# Patient Record
Sex: FEMALE | Race: BLACK OR AFRICAN AMERICAN | Employment: FULL TIME | URBAN - METROPOLITAN AREA
[De-identification: names, ages, dates, MRNs, and addresses within clinical notes are randomized per-mention and may not be internally consistent; named-entity substitution may affect disease eponyms.]

---
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
## 2020-02-05 ENCOUNTER — ED HISTORICAL/CONVERTED ENCOUNTER (OUTPATIENT)
Dept: OTHER | Age: 20
End: 2020-02-05

## 2020-07-15 VITALS
WEIGHT: 229.4 LBS | DIASTOLIC BLOOD PRESSURE: 74 MMHG | SYSTOLIC BLOOD PRESSURE: 118 MMHG | HEIGHT: 67 IN | BODY MASS INDEX: 36 KG/M2

## 2020-07-15 DIAGNOSIS — R10.2 VAGINAL PAIN: ICD-10-CM

## 2020-07-15 PROBLEM — A60.00 GENITAL HERPES SIMPLEX: Status: ACTIVE | Noted: 2020-07-15

## 2020-07-15 PROBLEM — E66.01 MORBID OBESITY (HCC): Status: ACTIVE | Noted: 2020-07-15

## 2020-07-15 PROBLEM — A74.9 CHLAMYDIAL INFECTION: Status: ACTIVE | Noted: 2020-07-15

## 2020-07-15 RX ORDER — HYDROXYZINE 25 MG/1
TABLET, FILM COATED ORAL
COMMUNITY
End: 2021-08-05 | Stop reason: ALTCHOICE

## 2020-07-15 RX ORDER — TRIAMCINOLONE ACETONIDE 1 MG/G
CREAM TOPICAL 2 TIMES DAILY
COMMUNITY
End: 2021-08-05 | Stop reason: ALTCHOICE

## 2020-07-15 RX ORDER — CLINDAMYCIN HYDROCHLORIDE 300 MG/1
300 CAPSULE ORAL 3 TIMES DAILY
COMMUNITY
End: 2021-08-05 | Stop reason: ALTCHOICE

## 2020-07-15 RX ORDER — LORATADINE 10 MG/1
10 TABLET ORAL
COMMUNITY
End: 2021-08-05 | Stop reason: ALTCHOICE

## 2020-07-15 RX ORDER — NORETHINDRONE ACETATE AND ETHINYL ESTRADIOL AND FERROUS FUMARATE 1MG-20(24)
KIT ORAL
COMMUNITY
End: 2020-08-18 | Stop reason: SDUPTHER

## 2020-07-15 RX ORDER — ACYCLOVIR 800 MG/1
800 TABLET ORAL 2 TIMES DAILY
COMMUNITY
End: 2020-11-14

## 2020-08-18 ENCOUNTER — OFFICE VISIT (OUTPATIENT)
Dept: OBGYN CLINIC | Age: 20
End: 2020-08-18
Payer: MEDICAID

## 2020-08-18 DIAGNOSIS — Z12.4 SCREENING FOR MALIGNANT NEOPLASM OF CERVIX: ICD-10-CM

## 2020-08-18 DIAGNOSIS — Z11.3 SCREENING EXAMINATION FOR VENEREAL DISEASE: Primary | ICD-10-CM

## 2020-08-18 DIAGNOSIS — Z01.419 GYNECOLOGIC EXAM NORMAL: ICD-10-CM

## 2020-08-18 DIAGNOSIS — N89.8 VAGINAL ODOR: ICD-10-CM

## 2020-08-18 DIAGNOSIS — N94.89 SUPPRESSION OF MENSTRUATION: ICD-10-CM

## 2020-08-18 PROCEDURE — 99395 PREV VISIT EST AGE 18-39: CPT | Performed by: OBSTETRICS & GYNECOLOGY

## 2020-08-18 RX ORDER — NORETHINDRONE ACETATE AND ETHINYL ESTRADIOL AND FERROUS FUMARATE 1MG-20(24)
1 KIT ORAL DAILY
Qty: 3 PACKAGE | Refills: 3 | Status: SHIPPED | OUTPATIENT
Start: 2020-08-18 | End: 2021-08-05 | Stop reason: ALTCHOICE

## 2020-08-18 RX ORDER — NORETHINDRONE ACETATE AND ETHINYL ESTRADIOL AND FERROUS FUMARATE 1MG-20(24)
1 KIT ORAL DAILY
Qty: 3 PACKAGE | Refills: 3 | Status: SHIPPED | OUTPATIENT
Start: 2020-08-18 | End: 2020-08-18 | Stop reason: SDUPTHER

## 2020-08-18 NOTE — PROGRESS NOTES
Angelica White is a [de-identified] , 21 y.o. female   No LMP recorded. She presents for her annual    She is having continues with vaginal odor off and on. Menstrual status:  Her periods are: normal. Cycles are: usually regular with a 26-32 day interval with 3-7 day duration. She does not have dysmenorrhea. Medical conditions:  Since her last annual GYN exam about one year ago, she has not the following changes in her health history: none. Past Medical History:   Diagnosis Date    Chlamydial infection 7/15/2020    Genital herpes simplex 7/15/2020    Herpes     Morbid obesity ([address] 7/15/2020    Vaginal pain 7/15/2020    *Chronic     History reviewed. No pertinent surgical history. Prior to Admission medications    Medication Sig Start Date End Date Taking? Authorizing Provider   norethindrone-e estradiol-iron (Junel Fe 24) 1 mg-20 mcg (24)/75 mg (4) tab Take 1 Tab by mouth daily. 8/18/20  Yes Andreina Molina MD   acyclovir (ZOVIRAX) 800 mg tablet Take 800 mg by mouth two (2) times a day. Yes Provider, Historical   loratadine (CLARITIN) 10 mg tablet Take 10 mg by mouth. Yes Provider, Historical   clindamycin (CLEOCIN) 300 mg capsule Take 300 mg by mouth three (3) times daily. Provider, Historical   hydrOXYzine HCL (ATARAX) 25 mg tablet Take  by mouth three (3) times daily as needed. Provider, Historical   triamcinolone acetonide (KENALOG) 0.1 % topical cream Apply  to affected area two (2) times a day. use thin layer    Provider, Historical       No Known Allergies       Tobacco History:  reports that she has never smoked. She does not have any smokeless tobacco history on file. Alcohol Abuse:  has no history on file for alcohol. Drug Abuse:  has no history on file for drug. Family Medical/Cancer History: History reviewed. No pertinent family history. Review of Systems   Constitutional: Negative for chills, fever, malaise/fatigue and weight loss.    HENT: Negative for congestion, ear pain, sinus pain and tinnitus. Eyes: Negative for blurred vision and double vision. Respiratory: Negative for cough, shortness of breath and wheezing. Cardiovascular: Negative for chest pain and palpitations. Gastrointestinal: Negative for abdominal pain, blood in stool, constipation, diarrhea, heartburn, nausea and vomiting. Genitourinary: Negative for dysuria, flank pain, frequency, hematuria and urgency. Musculoskeletal: Negative for joint pain and myalgias. Skin: Negative for itching and rash. Neurological: Negative for dizziness, weakness and headaches. Psychiatric/Behavioral: Negative for depression, memory loss and suicidal ideas. The patient is not nervous/anxious and does not have insomnia. Physical Exam  Constitutional:       Appearance: Normal appearance. HENT:      Head: Normocephalic and atraumatic. Cardiovascular:      Rate and Rhythm: Normal rate. Heart sounds: Normal heart sounds. Pulmonary:      Effort: Pulmonary effort is normal.      Breath sounds: Normal breath sounds. Chest:      Breasts:         Right: Normal.         Left: Normal.   Abdominal:      General: Abdomen is flat. Bowel sounds are normal.      Palpations: Abdomen is soft. Genitourinary:     General: Normal vulva. Vagina: Normal.      Cervix: Normal.      Uterus: Normal.       Adnexa: Right adnexa normal and left adnexa normal.      Rectum: Normal.      Comments: PAP Obtained  Neurological:      Mental Status: She is alert. Psychiatric:         Mood and Affect: Mood normal.         Behavior: Behavior normal.         Thought Content: Thought content normal.          There were no vitals taken for this visit. Assessment:  Diagnoses and all orders for this visit:    1.  Screening examination for venereal disease  -     PAP IG, CT-NG, RFX APTIMA HPV ASCUS ([id number], [id number]))  -     HEP B SURFACE AG  -     HCV AB W/REFLEX VERIFICATION  -     RPR  -     HIV 1/2 AG/AB, 4TH GENERATION,W RFLX CONFIRM  -     HSV1/HSV2(IGG/M)    2. Screening for malignant neoplasm of cervix  -     PAP IG, CT-NG, RFX APTIMA HPV ASCUS ([id number], [id number]))    3. Gynecologic exam normal    4. Suppression of menstruation    5. Vaginal odor    Other orders  -     norethindrone-e estradiol-iron (Junel Fe 24) 1 mg-20 mcg (24)/75 mg (4) tab; Take 1 Tab by mouth daily.         Plan:Boric Acid supp recommended  STD labs  Counseled re: diet, exercise, healthy lifestyle  Return for Annual        Follow-up and Dispositions    · Return for 1 yr annual.

## 2020-08-20 LAB
COMMENT, 144067: NORMAL
HBV SURFACE AG SERPL QL IA: NEGATIVE
HCV AB S/CO SERPL IA: <0.1 S/CO RATIO (ref 0–0.9)
HIV 1+2 AB+HIV1 P24 AG SERPL QL IA: NON REACTIVE
RPR SER QL: NON REACTIVE

## 2020-08-22 LAB
C TRACH RRNA CVX QL NAA+PROBE: NEGATIVE
CYTOLOGIST CVX/VAG CYTO: ABNORMAL
CYTOLOGY CVX/VAG DOC CYTO: ABNORMAL
CYTOLOGY CVX/VAG DOC THIN PREP: ABNORMAL
DX ICD CODE: ABNORMAL
DX ICD CODE: ABNORMAL
LABCORP, 190119: ABNORMAL
Lab: ABNORMAL
N GONORRHOEA RRNA CVX QL NAA+PROBE: NEGATIVE
OTHER STN SPEC: ABNORMAL
PATHOLOGIST CVX/VAG CYTO: ABNORMAL
STAT OF ADQ CVX/VAG CYTO-IMP: ABNORMAL

## 2020-09-01 NOTE — PROGRESS NOTES
9/1/2020:    Pap: LGSIL  GC/CT: Negative    LVM explaining results and to call and get set up for a [facility] appt. --instructions given to call [phone]

## 2020-11-04 ENCOUNTER — HOSPITAL ENCOUNTER (EMERGENCY)
Age: 20
Discharge: HOME | End: 2020-11-04
Payer: MEDICAID

## 2020-11-04 ENCOUNTER — APPOINTMENT (OUTPATIENT)
Dept: CT IMAGING | Age: 20
End: 2020-11-04
Attending: NURSE PRACTITIONER
Payer: MEDICAID

## 2020-11-04 VITALS
SYSTOLIC BLOOD PRESSURE: 123 MMHG | DIASTOLIC BLOOD PRESSURE: 72 MMHG | WEIGHT: 240 LBS | HEIGHT: 67 IN | BODY MASS INDEX: 37.67 KG/M2 | OXYGEN SATURATION: 100 % | HEART RATE: 93 BPM | TEMPERATURE: 98.8 F | RESPIRATION RATE: 15 BRPM
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
## 2020-11-04 DIAGNOSIS — S16.1XXA STRAIN OF NECK MUSCLE, INITIAL ENCOUNTER: ICD-10-CM

## 2020-11-04 LAB
ANION GAP SERPL CALC-SCNC: 7 MMOL/L (ref 5–15)
APPEARANCE UR: CLEAR
BACTERIA URNS QL MICRO: NEGATIVE /HPF
BASOPHILS # BLD: 0 K/UL (ref 0–0.1)
BASOPHILS NFR BLD: 1 % (ref 0–1)
BILIRUB UR QL: NEGATIVE
BUN SERPL-MCNC: 12 MG/DL (ref 6–20)
BUN/CREAT SERPL: 16 (ref 12–20)
CA-I BLD-MCNC: 8.9 MG/DL (ref 8.5–10.1)
CHLORIDE SERPL-SCNC: 105 MMOL/L (ref 97–108)
CO2 SERPL-SCNC: 27 MMOL/L (ref 21–32)
COLOR UR: ABNORMAL
CREAT SERPL-MCNC: 0.74 MG/DL (ref 0.55–1.02)
DIFFERENTIAL METHOD BLD: ABNORMAL
EOSINOPHIL # BLD: 0.1 K/UL (ref 0–0.4)
EOSINOPHIL NFR BLD: 2 % (ref 0–7)
ERYTHROCYTE [DISTWIDTH] IN BLOOD BY AUTOMATED COUNT: 12.9 % (ref 11.5–14.5)
GLUCOSE SERPL-MCNC: 79 MG/DL (ref 65–100)
GLUCOSE UR STRIP.AUTO-MCNC: NEGATIVE MG/DL
HCT VFR BLD AUTO: 39 % (ref 35–47)
HGB BLD-MCNC: 12.7 G/DL (ref 11.5–16)
HGB UR QL STRIP: NEGATIVE
IMM GRANULOCYTES # BLD AUTO: 0 K/UL (ref 0–0.04)
IMM GRANULOCYTES NFR BLD AUTO: 0 % (ref 0–0.5)
KETONES UR QL STRIP.AUTO: NEGATIVE MG/DL
LEUKOCYTE ESTERASE UR QL STRIP.AUTO: ABNORMAL
LYMPHOCYTES # BLD: 2 K/UL (ref 0.8–3.5)
LYMPHOCYTES NFR BLD: 49 % (ref 12–49)
MCH RBC QN AUTO: 29.6 PG (ref 26–34)
MCHC RBC AUTO-ENTMCNC: 32.6 G/DL (ref 30–36.5)
MCV RBC AUTO: 90.9 FL (ref 80–99)
MONOCYTES # BLD: 0.5 K/UL (ref 0–1)
MONOCYTES NFR BLD: 12 % (ref 5–13)
MUCOUS THREADS URNS QL MICRO: ABNORMAL /LPF
NEUTS SEG # BLD: 1.5 K/UL (ref 1.8–8)
NEUTS SEG NFR BLD: 36 % (ref 32–75)
NITRITE UR QL STRIP.AUTO: NEGATIVE
PH UR STRIP: 6 [PH] (ref 5–8)
PLATELET # BLD AUTO: 304 K/UL (ref 150–400)
PMV BLD AUTO: 10.7 FL (ref 8.9–12.9)
POTASSIUM SERPL-SCNC: 4 MMOL/L (ref 3.5–5.1)
PROT UR STRIP-MCNC: NEGATIVE MG/DL
RBC # BLD AUTO: 4.29 M/UL (ref 3.8–5.2)
RBC #/AREA URNS HPF: ABNORMAL /HPF (ref 0–5)
SODIUM SERPL-SCNC: 139 MMOL/L (ref 136–145)
SP GR UR REFRACTOMETRY: 1.02 (ref 1–1.03)
UA: UC IF INDICATED,UAUC: ABNORMAL
UROBILINOGEN UR QL STRIP.AUTO: 0.1 EU/DL (ref 0.1–1)
WBC # BLD AUTO: 4 K/UL (ref 3.6–11)
WBC URNS QL MICRO: ABNORMAL /HPF (ref 0–4)

## 2020-11-04 PROCEDURE — 74011250637 HC RX REV CODE- 250/637: Performed by: NURSE PRACTITIONER

## 2020-11-04 PROCEDURE — 85025 COMPLETE CBC W/AUTO DIFF WBC: CPT

## 2020-11-04 PROCEDURE — 74177 CT ABD & PELVIS W/CONTRAST: CPT

## 2020-11-04 PROCEDURE — 36415 COLL VENOUS BLD VENIPUNCTURE: CPT

## 2020-11-04 PROCEDURE — 74011000636 HC RX REV CODE- 636: Performed by: NURSE PRACTITIONER

## 2020-11-04 PROCEDURE — 99283 EMERGENCY DEPT VISIT LOW MDM: CPT

## 2020-11-04 PROCEDURE — 81001 URINALYSIS AUTO W/SCOPE: CPT

## 2020-11-04 PROCEDURE — 70450 CT HEAD/BRAIN W/O DYE: CPT

## 2020-11-04 PROCEDURE — 80048 BASIC METABOLIC PNL TOTAL CA: CPT

## 2020-11-04 PROCEDURE — 71275 CT ANGIOGRAPHY CHEST: CPT

## 2020-11-04 PROCEDURE — 72125 CT NECK SPINE W/O DYE: CPT

## 2020-11-04 RX ORDER — HYDROCODONE BITARTRATE AND ACETAMINOPHEN 5; 325 MG/1; MG/1
1 TABLET ORAL
Qty: 12 TAB | Refills: 0 | Status: SHIPPED | OUTPATIENT
Start: 2020-11-04 | End: 2020-11-07

## 2020-11-04 RX ORDER — CYCLOBENZAPRINE HCL 10 MG
10 TABLET ORAL
Status: COMPLETED | OUTPATIENT
Start: 2020-11-04 | End: 2020-11-04

## 2020-11-04 RX ORDER — HYDROCODONE BITARTRATE AND ACETAMINOPHEN 5; 325 MG/1; MG/1
2 TABLET ORAL
Status: COMPLETED | OUTPATIENT
Start: 2020-11-04 | End: 2020-11-04

## 2020-11-04 RX ORDER — IBUPROFEN 400 MG/1
400 TABLET ORAL
Qty: 20 TAB | Refills: 0 | Status: SHIPPED | OUTPATIENT
Start: 2020-11-04 | End: 2021-08-05 | Stop reason: ALTCHOICE

## 2020-11-04 RX ORDER — CYCLOBENZAPRINE HCL 10 MG
10 TABLET ORAL
Qty: 12 TAB | Refills: 0 | Status: SHIPPED | OUTPATIENT
Start: 2020-11-04 | End: 2021-08-05 | Stop reason: ALTCHOICE

## 2020-11-04 RX ADMIN — HYDROCODONE BITARTRATE AND ACETAMINOPHEN 2 TABLET: 5; 325 TABLET ORAL at 12:43

## 2020-11-04 RX ADMIN — CYCLOBENZAPRINE 10 MG: 10 TABLET, FILM COATED ORAL at 12:42

## 2020-11-04 RX ADMIN — IOPAMIDOL 100 ML: 755 INJECTION, SOLUTION INTRAVENOUS at 15:04
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
## 2020-11-14 RX ORDER — ACYCLOVIR 800 MG/1
TABLET ORAL
Qty: 14 TAB | Refills: 0 | Status: SHIPPED | OUTPATIENT
Start: 2020-11-14 | End: 2020-12-07

## 2020-12-07 RX ORDER — ACYCLOVIR 800 MG/1
TABLET ORAL
Qty: 14 TAB | Refills: 0 | Status: SHIPPED | OUTPATIENT
Start: 2020-12-07 | End: 2021-04-12

## 2021-01-20 ENCOUNTER — OFFICE VISIT (OUTPATIENT)
Dept: OBGYN CLINIC | Age: 21
End: 2021-01-20
Payer: COMMERCIAL

## 2021-01-20 VITALS
SYSTOLIC BLOOD PRESSURE: 120 MMHG | WEIGHT: 247 LBS | DIASTOLIC BLOOD PRESSURE: 84 MMHG | BODY MASS INDEX: 38.77 KG/M2 | HEIGHT: 67 IN

## 2021-01-20 DIAGNOSIS — N76.0 VAGINITIS AND VULVOVAGINITIS: ICD-10-CM

## 2021-01-20 DIAGNOSIS — Z11.3 ROUTINE SCREENING FOR STI (SEXUALLY TRANSMITTED INFECTION): Primary | ICD-10-CM

## 2021-01-20 PROCEDURE — 99214 OFFICE O/P EST MOD 30 MIN: CPT | Performed by: OBSTETRICS & GYNECOLOGY

## 2021-01-20 RX ORDER — FLUCONAZOLE 150 MG/1
150 TABLET ORAL DAILY
Qty: 1 TAB | Refills: 0 | Status: SHIPPED | OUTPATIENT
Start: 2021-01-20 | End: 2021-01-21

## 2021-01-20 NOTE — PROGRESS NOTES
Arslan Mccormick is a 21 y.o. female, , No LMP recorded (lmp unknown). (Menstrual status: Other (see Comment)). , who presents today for the following:  Chief Complaint   Patient presents with    Check Up     Pt wants std screening. No Known Allergies    Current Outpatient Medications   Medication Sig    fluconazole (DIFLUCAN) 150 mg tablet Take 1 Tab by mouth daily for 1 day. FDA advises cautious prescribing of oral fluconazole in pregnancy.  acyclovir (ZOVIRAX) 800 mg tablet TAKE 1 TABLET BY MOUTH TWICE DAILY FOR 7 DAYS    cyclobenzaprine (FLEXERIL) 10 mg tablet Take 1 Tab by mouth three (3) times daily as needed for Muscle Spasm(s).  ibuprofen (MOTRIN) 400 mg tablet Take 1 Tab by mouth every six (6) hours as needed for Pain.  norethindrone-e estradiol-iron (Junel Fe 24) 1 mg-20 mcg (24)/75 mg (4) tab Take 1 Tab by mouth daily.  clindamycin (CLEOCIN) 300 mg capsule Take 300 mg by mouth three (3) times daily.  hydrOXYzine HCL (ATARAX) 25 mg tablet Take  by mouth three (3) times daily as needed.  loratadine (CLARITIN) 10 mg tablet Take 10 mg by mouth.  triamcinolone acetonide (KENALOG) 0.1 % topical cream Apply  to affected area two (2) times a day. use thin layer     No current facility-administered medications for this visit. Past Medical History:   Diagnosis Date    Chlamydial infection 7/15/2020    Genital herpes simplex 7/15/2020    Herpes     Morbid obesity ([address] 7/15/2020    Vaginal pain 7/15/2020    *Chronic       History reviewed. No pertinent surgical history. History reviewed. No pertinent family history.     Social History     Socioeconomic History    Marital status: SINGLE     Spouse name: Not on file    Number of children: Not on file    Years of education: Not on file    Highest education level: Not on file   Occupational History    Not on file   Social Needs    Financial resource strain: Not on file    Food insecurity     Worry: Not on file Inability: Not on file    Transportation needs     Medical: Not on file     Non-medical: Not on file   Tobacco Use    Smoking status: Current Every Day Smoker    Smokeless tobacco: Never Used    Tobacco comment: pt smokes black and milds   Substance and Sexual Activity    Alcohol use: Not Currently    Drug use: Not on file    Sexual activity: Not on file   Lifestyle    Physical activity     Days per week: Not on file     Minutes per session: Not on file    Stress: Not on file   Relationships    Social connections     Talks on phone: Not on file     Gets together: Not on file     Attends [employer] service: Not on file     Active member of club or organization: Not on file     Attends meetings of clubs or organizations: Not on file     Relationship status: Not on file    Intimate partner violence     Fear of current or ex partner: Not on file     Emotionally abused: Not on file     Physically abused: Not on file     Forced sexual activity: Not on file   Other Topics Concern    Not on file   Social History Narrative    Not on file         HPI  Patient presents for evaluation  Desires STI screening  Denies any known exposure  Currently sexually active  Condom use  No symptoms    Review of Systems   Constitutional: Negative. Respiratory: Negative. Cardiovascular: Negative. Gastrointestinal: Negative. Genitourinary: Negative. Musculoskeletal: Negative. Skin: Negative. Neurological: Negative. Endo/Heme/Allergies: Negative. Psychiatric/Behavioral: Negative. All other systems reviewed and are negative. /84 (BP 1 Location: Left arm, BP Patient Position: Sitting)   Ht 5' 7\" (1.702 m)   Wt 247 lb (112 kg)   LMP  (LMP Unknown)   BMI 38.69 kg/m²    OBGyn Exam   PE:  Constitutional: General Appearance: healthy-appearing, well-nourished, well-developed, and well groomed. Psychiatric: Orientation: to time, place, and person.  Mood and Affect: normal mood and affect and appropriate and active and alert. Abdomen: Auscultation/Inspection/Palpation: tenderness and mass and non-distended. Hernia: none palpated. Female Genitalia: Vulva: no masses, atrophy, or lesions. Bladder/Urethra: no urethral discharge or mass and normal meatus and bladder non distended. Vagina no tenderness, erythema, cystocele, rectocele,scant discharge     Cervix: no discharge or cervical motion tenderness and grossly normal.     Uterus: mobile, non-tender, and no uterine prolapse. Adnexa/Parametria: no adnexal tenderness. 1. Routine screening for STI (sexually transmitted infection)    - NUSWAB VAGINITIS PLUS (VG+) WITH CANDIDA (SIX SPECIES)  - RPR; Future  - HIV 1/2 AG/AB, 4TH GENERATION,W RFLX CONFIRM  - HSV1/HSV2(IGG/M)    2. Vaginitis and vulvovaginitis    - fluconazole (DIFLUCAN) 150 mg tablet; Take 1 Tab by mouth daily for 1 day. FDA advises cautious prescribing of oral fluconazole in pregnancy. Dispense: 1 Tab; Refill: 0        Follow-up and Dispositions    · Return if symptoms worsen or fail to improve.

## 2021-01-20 NOTE — PATIENT INSTRUCTIONS

## 2021-01-22 LAB
A VAGINAE DNA VAG QL NAA+PROBE: ABNORMAL SCORE
BVAB2 DNA VAG QL NAA+PROBE: ABNORMAL SCORE
C ALBICANS DNA VAG QL NAA+PROBE: NEGATIVE
C GLABRATA DNA VAG QL NAA+PROBE: NEGATIVE
C KRUSEI DNA VAG QL NAA+PROBE: NEGATIVE
C LUSITANIAE DNA VAG QL NAA+PROBE: NEGATIVE
C TRACH DNA VAG QL NAA+PROBE: NEGATIVE
CANDIDA DNA VAG QL NAA+PROBE: NEGATIVE
HIV 1+2 AB+HIV1 P24 AG SERPL QL IA: NON REACTIVE
HSV1 IGG SER IA-ACNC: <0.91 INDEX (ref 0–0.9)
HSV1 IGM TITR SER IF: ABNORMAL TITER
HSV2 IGG SER IA-ACNC: 13.5 INDEX (ref 0–0.9)
HSV2 IGM TITR SER IF: ABNORMAL TITER
MEGA1 DNA VAG QL NAA+PROBE: ABNORMAL SCORE
N GONORRHOEA DNA VAG QL NAA+PROBE: NEGATIVE
T VAGINALIS DNA VAG QL NAA+PROBE: NEGATIVE

## 2021-01-28 ENCOUNTER — TELEPHONE (OUTPATIENT)
Dept: OBGYN CLINIC | Age: 21
End: 2021-01-28

## 2021-01-28 DIAGNOSIS — A49.9 BACTERIAL INFECTION: Primary | ICD-10-CM

## 2021-01-28 RX ORDER — METRONIDAZOLE 500 MG/1
500 TABLET ORAL 2 TIMES DAILY
Qty: 14 TAB | Refills: 0 | Status: SHIPPED | OUTPATIENT
Start: 2021-01-28 | End: 2021-02-04

## 2021-01-28 NOTE — TELEPHONE ENCOUNTER
Patient notified of bacterial infection and medication was sent to the pharmacy. Pt also made aware that she has been exposed to hsv 2.

## 2021-03-22 ENCOUNTER — HOSPITAL ENCOUNTER (EMERGENCY)
Age: 21
Discharge: HOME | End: 2021-03-23
Attending: EMERGENCY MEDICINE
Payer: MEDICAID

## 2021-03-22 VITALS
RESPIRATION RATE: 19 BRPM | OXYGEN SATURATION: 95 % | SYSTOLIC BLOOD PRESSURE: 117 MMHG | HEART RATE: 75 BPM | HEIGHT: 67 IN | DIASTOLIC BLOOD PRESSURE: 77 MMHG | TEMPERATURE: 101 F | BODY MASS INDEX: 40.02 KG/M2 | WEIGHT: 255 LBS

## 2021-03-22 DIAGNOSIS — T63.304A SPIDER BITE WOUND, UNDETERMINED INTENT, INITIAL ENCOUNTER: Primary | ICD-10-CM

## 2021-03-22 DIAGNOSIS — L03.311 CELLULITIS OF ABDOMINAL WALL: ICD-10-CM

## 2021-03-22 PROCEDURE — 96372 THER/PROPH/DIAG INJ SC/IM: CPT

## 2021-03-22 PROCEDURE — 74011000250 HC RX REV CODE- 250: Performed by: EMERGENCY MEDICINE

## 2021-03-22 PROCEDURE — 74011250637 HC RX REV CODE- 250/637: Performed by: EMERGENCY MEDICINE

## 2021-03-22 PROCEDURE — 74011250636 HC RX REV CODE- 250/636: Performed by: EMERGENCY MEDICINE

## 2021-03-22 PROCEDURE — 99283 EMERGENCY DEPT VISIT LOW MDM: CPT

## 2021-03-22 RX ORDER — CIPROFLOXACIN 500 MG/1
500 TABLET ORAL 2 TIMES DAILY
Qty: 20 TAB | Refills: 0 | Status: SHIPPED | OUTPATIENT
Start: 2021-03-22 | End: 2021-04-01

## 2021-03-22 RX ORDER — DOXYCYCLINE HYCLATE 100 MG
100 TABLET ORAL 2 TIMES DAILY
Qty: 20 TAB | Refills: 0 | Status: SHIPPED | OUTPATIENT
Start: 2021-03-22 | End: 2021-04-01

## 2021-03-22 RX ORDER — DOXYCYCLINE 100 MG/1
100 CAPSULE ORAL EVERY 12 HOURS
Status: DISCONTINUED | OUTPATIENT
Start: 2021-03-22 | End: 2021-03-23 | Stop reason: HOSPADM

## 2021-03-22 RX ADMIN — DOXYCYCLINE HYCLATE 100 MG: 100 CAPSULE ORAL at 23:45

## 2021-03-22 RX ADMIN — LIDOCAINE HYDROCHLORIDE 1 G: 10 INJECTION, SOLUTION INFILTRATION; PERINEURAL at 23:15

## 2021-03-23 NOTE — ED PROVIDER NOTES
EMERGENCY DEPARTMENT HISTORY AND PHYSICAL EXAM      Date: 3/22/2021  Patient Name: Carlos Burgos      History of Presenting Illness     Chief Complaint   Patient presents with    Skin Problem       History Provided By: Patient    HPI: Carlos Burgos, 21 y.o. female with a past medical history significant No significant past medical history presents to the ED with cc of spider bite and redness on the right lower abdominal wall. Patient did not see a spider however she has stated that her coworker had similar symptoms and found that it was a spider bite. Patient denies any vomiting. No headache. No vomiting or diarrhea. No intra-abdominal pain. No shortness of breath or chest pain. No other complaints at this time. There are no other complaints, changes, or physical findings at this time. PCP: None    Current Facility-Administered Medications   Medication Dose Route Frequency Provider Last Rate Last Admin    doxycycline (VIBRAMYCIN) capsule 100 mg  100 mg Oral Q12H Collin Crowell, DO        cefTRIAXone (ROCEPHIN) 1 g in lidocaine (XYLOCAINE) 10 mg/mL (1 %) IM syringe  1 g IntraMUSCular NOW Miah Snider DO         Current Outpatient Medications   Medication Sig Dispense Refill    ciprofloxacin HCl (Cipro) 500 mg tablet Take 1 Tab by mouth two (2) times a day for 10 days. 20 Tab 0    doxycycline (VIBRA-TABS) 100 mg tablet Take 1 Tab by mouth two (2) times a day for 10 days. 20 Tab 0    acyclovir (ZOVIRAX) 800 mg tablet TAKE 1 TABLET BY MOUTH TWICE DAILY FOR 7 DAYS 14 Tab 0    cyclobenzaprine (FLEXERIL) 10 mg tablet Take 1 Tab by mouth three (3) times daily as needed for Muscle Spasm(s). 12 Tab 0    ibuprofen (MOTRIN) 400 mg tablet Take 1 Tab by mouth every six (6) hours as needed for Pain. 20 Tab 0    norethindrone-e estradiol-iron (Junel Fe 24) 1 mg-20 mcg (24)/75 mg (4) tab Take 1 Tab by mouth daily.  3 Package 3    clindamycin (CLEOCIN) 300 mg capsule Take 300 mg by mouth three (3) times daily.  hydrOXYzine HCL (ATARAX) 25 mg tablet Take  by mouth three (3) times daily as needed.  loratadine (CLARITIN) 10 mg tablet Take 10 mg by mouth.  triamcinolone acetonide (KENALOG) 0.1 % topical cream Apply  to affected area two (2) times a day. use thin layer         Past History     Past Medical History:  Past Medical History:   Diagnosis Date    Chlamydial infection 7/15/2020    Genital herpes simplex 7/15/2020    Herpes     Morbid obesity ([address] 7/15/2020    Vaginal pain 7/15/2020    *Chronic       Past Surgical History:  History reviewed. No pertinent surgical history. Family History:  History reviewed. No pertinent family history. Social History:  Social History     Tobacco Use    Smoking status: Current Every Day Smoker    Smokeless tobacco: Never Used    Tobacco comment: pt smokes black and milds   Substance Use Topics    Alcohol use: Not Currently    Drug use: Not on file       Allergies:  No Known Allergies      Review of Systems     Review of Systems   Constitutional: Negative. HENT: Negative. Eyes: Negative. Respiratory: Negative. Cardiovascular: Negative. Gastrointestinal: Negative. Endocrine: Negative. Genitourinary: Negative. Musculoskeletal: Negative. Skin: Positive for rash and wound. Neurological: Negative. Psychiatric/Behavioral: Negative. All other systems reviewed and are negative. Physical Exam     Physical Exam  Vitals signs and nursing note reviewed. Constitutional:       General: She is not in acute distress. Appearance: Normal appearance. She is obese. She is not ill-appearing, toxic-appearing or diaphoretic. HENT:      Head: Normocephalic and atraumatic. Nose: Nose normal. No congestion or rhinorrhea. Eyes:      General: No scleral icterus. Right eye: No discharge. Left eye: No discharge. Extraocular Movements: Extraocular movements intact.       Conjunctiva/sclera: Conjunctivae normal.      Pupils: Pupils are equal, round, and reactive to light. Neck:      Musculoskeletal: Normal range of motion and neck supple. No neck rigidity or muscular tenderness. Cardiovascular:      Rate and Rhythm: Normal rate and regular rhythm. Pulses: Normal pulses. Heart sounds: Normal heart sounds. Pulmonary:      Effort: Pulmonary effort is normal. No respiratory distress. Breath sounds: Normal breath sounds. No stridor. No wheezing, rhonchi or rales. Chest:      Chest wall: No tenderness. Abdominal:      General: Abdomen is flat. Bowel sounds are normal. There is no distension. Tenderness: There is no abdominal tenderness. There is no right CVA tenderness, left CVA tenderness, guarding or rebound. Musculoskeletal: Normal range of motion. General: No swelling, tenderness, deformity or signs of injury. Right lower leg: No edema. Left lower leg: No edema. Skin:     General: Skin is warm. Coloration: Skin is not jaundiced or pale. Findings: Erythema and rash present. No bruising or lesion. Comments: Positive erythema redness noted on the right lower quadrant area. There is about 4 x 5 cm. No signs of abscess formation at this point. No signs of ulceration. Neurological:      General: No focal deficit present. Mental Status: She is alert and oriented to person, place, and time. Cranial Nerves: No cranial nerve deficit. Sensory: No sensory deficit. Motor: No weakness. Coordination: Coordination normal.   Psychiatric:         Mood and Affect: Mood normal.         Behavior: Behavior normal.         Thought Content: Thought content normal.         Judgment: Judgment normal.         Lab and Diagnostic Study Results     Labs -   No results found for this or any previous visit (from the past 12 hour(s)).     Radiologic Studies -   [unfilled]  CT Results  (Last 48 hours)    None        CXR Results  (Last 48 hours)    None          Medical Decision Making and ED Course   - I am the first and primary provider for this patient AND AM THE PRIMARY PROVIDER OF RECORD. - I reviewed the vital signs, available nursing notes, past medical history, past surgical history, family history and social history. - Initial assessment performed. The patients presenting problems have been discussed, and the staff are in agreement with the care plan formulated and outlined with them. I have encouraged them to ask questions as they arise throughout their visit. Vital Signs-Reviewed the patient's vital signs. Patient Vitals for the past 12 hrs:   Temp Pulse Resp BP SpO2   03/22/21 2245 (!) 101 °F (38.3 °C) 75 19 117/77 95 %         Records Reviewed: Nursing Notes        ED Course:              Provider Notes (Medical Decision Making):     MDM  Number of Diagnoses or Management Options  Cellulitis of abdominal wall: new, needed workup  Spider bite wound, undetermined intent, initial encounter: new, needed workup  Diagnosis management comments: Differential diagnosis include a spider bite, insect bite, cellulitis, abscess. Risk of Complications, Morbidity, and/or Mortality  Presenting problems: high  Diagnostic procedures: moderate  Management options: high  General comments: Patient remained stable throughout the course of treatment. Rocephin IM and doxycycline p.o. given. Will discharge patient home on doxycycline and Cipro p.o. for next 10 days. She was advised to follow-up with PCP. Patient understood and agree with her management. She was advised if the area swells up or if the redness gets worse that could be the signs of abscess and she should return to ER for I&D of abscess.                Consultations:       Consultations:         Procedures and Critical Care       Performed by: Catie Soliz DO  PROCEDURES:  Procedures               CRITICAL CARE NOTE :  11:15 PM  Amount of Critical Care Time: (minutes)    IMPENDING DETERIORATION -  ASSOCIATED RISK FACTORS -   MANAGEMENT-   INTERPRETATION -    INTERVENTIONS -   CASE REVIEW -   TREATMENT RESPONSE -  PERFORMED BY -     NOTES   :  I have spent critical care time involved in lab review, consultations with specialist, family decision- making, bedside attention and documentation. This time excludes time spent in any separate billed procedures. During this entire length of time I was immediately available to the patient . Catie Soliz, DO        Disposition     Disposition: Condition stable    Discharged    Remove if not discharged  DISCHARGE PLAN:  1. Current Discharge Medication List      START taking these medications    Details   ciprofloxacin HCl (Cipro) 500 mg tablet Take 1 Tab by mouth two (2) times a day for 10 days. Qty: 20 Tab, Refills: 0      doxycycline (VIBRA-TABS) 100 mg tablet Take 1 Tab by mouth two (2) times a day for 10 days. Qty: 20 Tab, Refills: 0         CONTINUE these medications which have NOT CHANGED    Details   acyclovir (ZOVIRAX) 800 mg tablet TAKE 1 TABLET BY MOUTH TWICE DAILY FOR 7 DAYS  Qty: 14 Tab, Refills: 0      cyclobenzaprine (FLEXERIL) 10 mg tablet Take 1 Tab by mouth three (3) times daily as needed for Muscle Spasm(s). Qty: 12 Tab, Refills: 0      ibuprofen (MOTRIN) 400 mg tablet Take 1 Tab by mouth every six (6) hours as needed for Pain. Qty: 20 Tab, Refills: 0      norethindrone-e estradiol-iron (Junel Fe 24) 1 mg-20 mcg (24)/75 mg (4) tab Take 1 Tab by mouth daily. Qty: 3 Package, Refills: 3      clindamycin (CLEOCIN) 300 mg capsule Take 300 mg by mouth three (3) times daily. hydrOXYzine HCL (ATARAX) 25 mg tablet Take  by mouth three (3) times daily as needed. loratadine (CLARITIN) 10 mg tablet Take 10 mg by mouth.      triamcinolone acetonide (KENALOG) 0.1 % topical cream Apply  to affected area two (2) times a day. use thin layer           2.    Follow-up Information     Follow up With Specialties Details Why [address]  Schedule an appointment as soon as possible for a visit in 1 day  [address] [facility]        3. Return to ED if worse   4. Current Discharge Medication List      START taking these medications    Details   ciprofloxacin HCl (Cipro) 500 mg tablet Take 1 Tab by mouth two (2) times a day for 10 days. Qty: 20 Tab, Refills: 0      doxycycline (VIBRA-TABS) 100 mg tablet Take 1 Tab by mouth two (2) times a day for 10 days. Qty: 20 Tab, Refills: 0             Diagnosis     Clinical Impression:   1. Spider bite wound, undetermined intent, initial encounter    2. Cellulitis of abdominal wall        Attestations:    Buck Marlow, DO    Please note that this dictation was completed with [employer], the computer voice recognition software. Quite often unanticipated grammatical, syntax, homophones, and other interpretive errors are inadvertently transcribed by the computer software. Please disregard these errors. Please excuse any errors that have escaped final proofreading. Thank you.

## 2021-03-29 ENCOUNTER — HOSPITAL ENCOUNTER (EMERGENCY)
Age: 21
Discharge: HOME | End: 2021-03-29
Payer: MEDICAID

## 2021-03-29 ENCOUNTER — APPOINTMENT (OUTPATIENT)
Dept: GENERAL RADIOLOGY | Age: 21
End: 2021-03-29
Attending: NURSE PRACTITIONER
Payer: MEDICAID

## 2021-03-29 VITALS
SYSTOLIC BLOOD PRESSURE: 109 MMHG | WEIGHT: 255 LBS | BODY MASS INDEX: 40.02 KG/M2 | HEIGHT: 67 IN | DIASTOLIC BLOOD PRESSURE: 72 MMHG | RESPIRATION RATE: 16 BRPM | HEART RATE: 88 BPM | OXYGEN SATURATION: 98 % | TEMPERATURE: 98.6 F

## 2021-03-29 DIAGNOSIS — S93.402A SPRAIN OF LEFT ANKLE, UNSPECIFIED LIGAMENT, INITIAL ENCOUNTER: Primary | ICD-10-CM

## 2021-03-29 PROCEDURE — 99282 EMERGENCY DEPT VISIT SF MDM: CPT

## 2021-03-29 PROCEDURE — 73610 X-RAY EXAM OF ANKLE: CPT

## 2021-03-29 RX ORDER — IBUPROFEN 400 MG/1
400 TABLET ORAL
Qty: 20 TAB | Refills: 0 | Status: SHIPPED | OUTPATIENT
Start: 2021-03-29 | End: 2021-08-05 | Stop reason: ALTCHOICE

## 2021-03-29 NOTE — ED PROVIDER NOTES
EMERGENCY DEPARTMENT HISTORY AND PHYSICAL EXAM      Date: 3/29/2021  Patient Name: Joshua Díaz    History of Presenting Illness     Chief Complaint   Patient presents with    Ankle Pain    Skin Problem       History Provided By: Patient    HPI: Joshua Díaz, 21 y.o. female with a past medical history significant obesity presents to the ED with cc of left ankle pain. Patient was getting up when she slipped and fell this morning. Patient denies any dizziness prior to falling. Patient states she rolled her ankle to the outside. Patient has been able to ambulate just with pain. Patient noticed swelling to the outside of her ankle. Patient comes in for evaluation of her ankle. Patient also has a wound on her right lower abdomen. Patient was seen last week for same. Patient has not filled her antibiotics nor started taking them for that. Patient denies systemic symptoms. Patient specifically denies fever, chills, chest pain, shortness of breath, abdominal pain, nausea, vomiting, diarrhea. Moderate severity, no known exacerbating or relieving factors, no other associated signs and symptoms    There are no other complaints, changes, or physical findings at this time. PCP: None    No current facility-administered medications on file prior to encounter. Current Outpatient Medications on File Prior to Encounter   Medication Sig Dispense Refill    ciprofloxacin HCl (Cipro) 500 mg tablet Take 1 Tab by mouth two (2) times a day for 10 days. 20 Tab 0    doxycycline (VIBRA-TABS) 100 mg tablet Take 1 Tab by mouth two (2) times a day for 10 days. 20 Tab 0    acyclovir (ZOVIRAX) 800 mg tablet TAKE 1 TABLET BY MOUTH TWICE DAILY FOR 7 DAYS 14 Tab 0    cyclobenzaprine (FLEXERIL) 10 mg tablet Take 1 Tab by mouth three (3) times daily as needed for Muscle Spasm(s). 12 Tab 0    ibuprofen (MOTRIN) 400 mg tablet Take 1 Tab by mouth every six (6) hours as needed for Pain.  20 Tab 0    norethindrone-e estradiol-iron (Junel Fe 24) 1 mg-20 mcg (24)/75 mg (4) tab Take 1 Tab by mouth daily. 3 Package 3    clindamycin (CLEOCIN) 300 mg capsule Take 300 mg by mouth three (3) times daily.  hydrOXYzine HCL (ATARAX) 25 mg tablet Take  by mouth three (3) times daily as needed.  loratadine (CLARITIN) 10 mg tablet Take 10 mg by mouth.  triamcinolone acetonide (KENALOG) 0.1 % topical cream Apply  to affected area two (2) times a day. use thin layer         Past History     Past Medical History:  Past Medical History:   Diagnosis Date    Chlamydial infection 7/15/2020    Genital herpes simplex 7/15/2020    Herpes     Morbid obesity ([address] 7/15/2020    Vaginal pain 7/15/2020    *Chronic       Past Surgical History:  No past surgical history on file. Family History:  History reviewed. No pertinent family history. Social History:  Social History     Tobacco Use    Smoking status: Current Every Day Smoker    Smokeless tobacco: Never Used    Tobacco comment: pt smokes black and milds   Substance Use Topics    Alcohol use: Not Currently    Drug use: Not on file       Allergies:  No Known Allergies      Review of Systems     Review of Systems   Constitutional: Negative for chills, fatigue and fever. HENT: Negative for congestion, sinus pressure and trouble swallowing. Eyes: Negative for photophobia and pain. Respiratory: Negative for cough and shortness of breath. Cardiovascular: Negative for chest pain and leg swelling. Gastrointestinal: Negative for abdominal pain, diarrhea, nausea and vomiting. Endocrine: Negative for polydipsia, polyphagia and polyuria. Genitourinary: Negative for decreased urine volume, difficulty urinating, dysuria, hematuria and urgency. Musculoskeletal: Positive for arthralgias and joint swelling. Negative for gait problem, myalgias and neck pain. Skin: Positive for wound. Negative for pallor and rash.    Allergic/Immunologic: Negative for environmental allergies and food allergies. Neurological: Negative for dizziness, facial asymmetry, speech difficulty, numbness and headaches. Hematological: Negative for adenopathy. Does not bruise/bleed easily. Psychiatric/Behavioral: Negative for agitation, self-injury and suicidal ideas. The patient is not nervous/anxious. Physical Exam     Physical Exam  Vitals signs and nursing note reviewed. Constitutional:       Appearance: Normal appearance. HENT:      Head: Atraumatic. Right Ear: Tympanic membrane and external ear normal.      Left Ear: Tympanic membrane and external ear normal.      Nose: Nose normal.      Mouth/Throat:      Mouth: Mucous membranes are moist.   Eyes:      Extraocular Movements: Extraocular movements intact. Pupils: Pupils are equal, round, and reactive to light. Neck:      Musculoskeletal: Normal range of motion and neck supple. Cardiovascular:      Rate and Rhythm: Normal rate and regular rhythm. Pulses: Normal pulses. Heart sounds: Normal heart sounds. Pulmonary:      Breath sounds: Normal breath sounds. Abdominal:      General: Abdomen is flat. Palpations: Abdomen is soft. Musculoskeletal:      Left ankle: She exhibits decreased range of motion and swelling. She exhibits no deformity. Tenderness. Lateral malleolus tenderness found. Skin:     General: Skin is warm and dry. Capillary Refill: Capillary refill takes less than 2 seconds. Comments: Known wound being treated   Neurological:      General: No focal deficit present. Mental Status: She is alert and oriented to person, place, and time. Mental status is at baseline. Psychiatric:         Mood and Affect: Mood normal.         Behavior: Behavior normal.         Lab and Diagnostic Study Results     Labs -   No results found for this or any previous visit (from the past 12 hour(s)).     Radiologic Studies -   @lastxrresult@  CT Results  (Last 48 hours)    None        CXR Results  (Last 48 hours)    None            Medical Decision Making   - I am the first provider for this patient. - I reviewed the vital signs, available nursing notes, past medical history, past surgical history, family history and social history. - Initial assessment performed. The patients presenting problems have been discussed, and they are in agreement with the care plan formulated and outlined with them. I have encouraged them to ask questions as they arise throughout their visit. Vital Signs-Reviewed the patient's vital signs. Patient Vitals for the past 12 hrs:   Temp Pulse Resp BP SpO2   03/29/21 1413 98.6 °F (37 °C) 88 16 109/72 98 %       Records Reviewed: Nursing Notes and Old Medical Records          ED Course:          Provider Notes (Medical Decision Making): This patient presents with an ankle injury. Differential diagnosis include ankle sprain, ankle fracture, strain x-rays were negative. There is no sign of fracture or dislocation. Exam and workup consistent with an ankle sprain. Distally neurovascularly intact. The patient is discharged home with conservative management and symptomatic treatment. MDM       Procedures   Medical Decision Makingedical Decision Making  Performed by: Denise Dash NP  PROCEDURES:  Procedures       Disposition   Disposition: DC- Adult Discharges: All of the diagnostic tests were reviewed and questions answered. Diagnosis, care plan and treatment options were discussed. The patient understands the instructions and will follow up as directed. The patients results have been reviewed with them. They have been counseled regarding their diagnosis. The patient verbally convey understanding and agreement of the signs, symptoms, diagnosis, treatment and prognosis and additionally agrees to follow up as recommended with their PCP in 24 - 48 hours. They also agree with the care-plan and convey that all of their questions have been answered.   I have also put together some discharge instructions for them that include: 1) educational information regarding their diagnosis, 2) how to care for their diagnosis at home, as well a 3) list of reasons why they would want to return to the ED prior to their follow-up appointment, should their condition change. Discharged    DISCHARGE PLAN:  1. Current Discharge Medication List      CONTINUE these medications which have NOT CHANGED    Details   ciprofloxacin HCl (Cipro) 500 mg tablet Take 1 Tab by mouth two (2) times a day for 10 days. Qty: 20 Tab, Refills: 0      doxycycline (VIBRA-TABS) 100 mg tablet Take 1 Tab by mouth two (2) times a day for 10 days. Qty: 20 Tab, Refills: 0      acyclovir (ZOVIRAX) 800 mg tablet TAKE 1 TABLET BY MOUTH TWICE DAILY FOR 7 DAYS  Qty: 14 Tab, Refills: 0      cyclobenzaprine (FLEXERIL) 10 mg tablet Take 1 Tab by mouth three (3) times daily as needed for Muscle Spasm(s). Qty: 12 Tab, Refills: 0      ibuprofen (MOTRIN) 400 mg tablet Take 1 Tab by mouth every six (6) hours as needed for Pain. Qty: 20 Tab, Refills: 0      norethindrone-e estradiol-iron (Junel Fe 24) 1 mg-20 mcg (24)/75 mg (4) tab Take 1 Tab by mouth daily. Qty: 3 Package, Refills: 3      clindamycin (CLEOCIN) 300 mg capsule Take 300 mg by mouth three (3) times daily. hydrOXYzine HCL (ATARAX) 25 mg tablet Take  by mouth three (3) times daily as needed. loratadine (CLARITIN) 10 mg tablet Take 10 mg by mouth.      triamcinolone acetonide (KENALOG) 0.1 % topical cream Apply  to affected area two (2) times a day. use thin layer           2. Follow-up Information     Follow up With Specialties Details Why Contact Info    Roxy Parham MD Orthopedic Surgery, Sports Medicine   [place] [id number]  [phone]          3. Return to ED if worse   4.    Current Discharge Medication List      START taking these medications    Details   !! ibuprofen (MOTRIN) 400 mg tablet Take 1 Tab by mouth every six (6) hours as needed for Pain. Qty: 20 Tab, Refills: 0       !! - Potential duplicate medications found. Please discuss with provider. CONTINUE these medications which have NOT CHANGED    Details   !! ibuprofen (MOTRIN) 400 mg tablet Take 1 Tab by mouth every six (6) hours as needed for Pain. Qty: 20 Tab, Refills: 0       !! - Potential duplicate medications found. Please discuss with provider. Diagnosis     Clinical Impression:   1. Sprain of left ankle, unspecified ligament, initial encounter        Attestations:    Shanda Perez NP    Please note that this dictation was completed with [employer], the computer voice recognition software. Quite often unanticipated grammatical, syntax, homophones, and other interpretive errors are inadvertently transcribed by the computer software. Please disregard these errors. Please excuse any errors that have escaped final proofreading. Thank you.

## 2021-03-29 NOTE — ED TRIAGE NOTES
Pt states she was seen last week for a \"spider bite\" on her R lower abdomen. Given ABX, states she didn't take them. Pain worsening. Site looks red. also states she fells this morning and C/o Left ankle pain.

## 2021-04-12 RX ORDER — ACYCLOVIR 800 MG/1
TABLET ORAL
Qty: 14 TAB | Refills: 0 | Status: SHIPPED | OUTPATIENT
Start: 2021-04-12 | End: 2021-05-10

## 2021-08-05 ENCOUNTER — OFFICE VISIT (OUTPATIENT)
Dept: OBGYN CLINIC | Age: 21
End: 2021-08-05
Payer: MEDICAID

## 2021-08-05 VITALS
SYSTOLIC BLOOD PRESSURE: 128 MMHG | HEIGHT: 67 IN | WEIGHT: 260.4 LBS | BODY MASS INDEX: 40.87 KG/M2 | DIASTOLIC BLOOD PRESSURE: 70 MMHG

## 2021-08-05 DIAGNOSIS — Z11.3 SCREENING EXAMINATION FOR VENEREAL DISEASE: Primary | ICD-10-CM

## 2021-08-05 PROCEDURE — 99213 OFFICE O/P EST LOW 20 MIN: CPT | Performed by: OBSTETRICS & GYNECOLOGY

## 2021-08-05 NOTE — PROGRESS NOTES
Derek Yu is a 24 y.o. female who presents today for the following:  Chief Complaint   Patient presents with    Sexually Transmitted Disease     Pt presents for STI testing. No Known Allergies    No current outpatient medications on file. No current facility-administered medications for this visit. Past Medical History:   Diagnosis Date    Chlamydial infection 7/15/2020    Genital herpes simplex 7/15/2020    Herpes     LGSIL on Pap smear of cervix 08-22-21    Morbid obesity ([address] 7/15/2020    Vaginal pain 7/15/2020    *Chronic       No past surgical history on file. No family history on file. Social History     Socioeconomic History    Marital status: SINGLE     Spouse name: Not on file    Number of children: Not on file    Years of education: Not on file    Highest education level: Not on file   Occupational History    Not on file   Tobacco Use    Smoking status: Current Every Day Smoker    Smokeless tobacco: Never Used    Tobacco comment: pt smokes black and milds   Substance and Sexual Activity    Alcohol use: Not Currently    Drug use: Not on file    Sexual activity: Yes     Partners: Male     Birth control/protection: Pill   Other Topics Concern    Not on file   Social History Narrative    Not on file     Social Determinants of Health     Financial Resource Strain:     Difficulty of Paying Living Expenses:    Food Insecurity:     Worried About Running Out of Food in the Last Year:     [address] in the Last Year:    Transportation Needs:     Lack of Transportation (Medical):      Lack of Transportation (Non-Medical):    Physical Activity:     Days of Exercise per Week:     Minutes of Exercise per Session:    Stress:     Feeling of Stress :    Social Connections:     Frequency of Communication with Friends and Family:     Frequency of Social Gatherings with Friends and Family:     Attends Islam Services:     Active Member of Clubs or Organizations:     Attends Club or Organization Meetings:     Marital Status:    Intimate Partner Violence:     Fear of Current or Ex-Partner:     Emotionally Abused:     Physically Abused:     Sexually Abused:          ROS   Review of Systems   Constitutional: Negative. HENT: Negative. Eyes: Negative. Respiratory: Negative. Cardiovascular: Negative. Gastrointestinal: Negative. Genitourinary: Negative. Musculoskeletal: Negative. Skin: Negative. Neurological: Negative. Endo/Heme/Allergies: Negative. Psychiatric/Behavioral: Negative.       /70   Ht 5' 7\" (1.702 m)   Wt 260 lb 6.4 oz (118.1 kg)   LMP 07/10/2021   BMI 40.78 kg/m²    OBGyn Exam   Constitutional  · Appearance: well-nourished, well developed, alert, in no acute distress    HENT  · Head and Face: appears normal    Chest  · Respiratory Effort: breathing labored    Gastrointestinal  · Abdominal Examination: abdomen non-tender to palpation, normal bowel sounds, no masses present    Genitourinary  · External Genitalia: normal appearance for age, no discharge present, no tenderness present, no inflammatory lesions present, no masses present, no atrophy present  · Vagina: normal vaginal vault without central or paravaginal defects, no discharge present, no inflammatory lesions present, no masses present  · Bladder: non-tender to palpation  · Urethra: appears normal  · Cervix: normal   · Uterus: normal size, shape and consistency  · Adnexa: no adnexal tenderness present, no adnexal masses present  · Perineum: perineum within normal limits, no evidence of trauma, no rashes or skin lesions present  · Anus: anus within normal limits, no hemorrhoids present  · Inguinal Lymph Nodes: no lymphadenopathy present    Skin  · General Inspection: no rash, no lesions identified    Neurologic/Psychiatric  · Mental Status:  · Orientation: grossly oriented to person, place and time  · Mood and Affect: mood normal, affect appropriate    No results found for this visit on 08/05/21. Orders Placed This Encounter    NUSWAB VAGINITIS PLUS (VG+) WITH CANDIDA (SIX SPECIES)    HIV 1/2 AG/AB, 4TH GENERATION,W RFLX CONFIRM    RPR    HEPATITIS PANEL, ACUTE     22 YO STD EXPOSURE    1.  Screening examination for venereal disease    - NUSWAB VAGINITIS PLUS (VG+) WITH CANDIDA (SIX SPECIES)  - HIV 1/2 AG/AB, 4TH GENERATION,W RFLX CONFIRM  - RPR  - HEPATITIS PANEL, ACUTE

## 2021-08-06 LAB
HAV IGM SERPL QL IA: NEGATIVE
HBV CORE IGM SERPL QL IA: NEGATIVE
HBV SURFACE AG SERPL QL IA: NEGATIVE
HCV AB S/CO SERPL IA: <0.1 S/CO RATIO (ref 0–0.9)
HIV 1+2 AB+HIV1 P24 AG SERPL QL IA: NON REACTIVE
RPR SER QL: NON REACTIVE

## 2021-08-08 LAB
A VAGINAE DNA VAG QL NAA+PROBE: ABNORMAL SCORE
BVAB2 DNA VAG QL NAA+PROBE: ABNORMAL SCORE
C ALBICANS DNA VAG QL NAA+PROBE: NEGATIVE
C GLABRATA DNA VAG QL NAA+PROBE: NEGATIVE
C KRUSEI DNA VAG QL NAA+PROBE: NEGATIVE
C LUSITANIAE DNA VAG QL NAA+PROBE: NEGATIVE
C TRACH DNA VAG QL NAA+PROBE: NEGATIVE
CANDIDA DNA VAG QL NAA+PROBE: NEGATIVE
MEGA1 DNA VAG QL NAA+PROBE: ABNORMAL SCORE
N GONORRHOEA DNA VAG QL NAA+PROBE: NEGATIVE
T VAGINALIS DNA VAG QL NAA+PROBE: NEGATIVE

## 2021-08-09 DIAGNOSIS — N76.0 VAGINITIS AND VULVOVAGINITIS: Primary | ICD-10-CM

## 2021-08-09 RX ORDER — METRONIDAZOLE 500 MG/1
500 TABLET ORAL 2 TIMES DAILY
Qty: 14 TABLET | Refills: 0 | Status: SHIPPED | OUTPATIENT
Start: 2021-08-09 | End: 2021-08-16

## 2021-09-01 ENCOUNTER — OFFICE VISIT (OUTPATIENT)
Dept: OBGYN CLINIC | Age: 21
End: 2021-09-01
Payer: MEDICAID

## 2021-09-01 VITALS
HEIGHT: 67 IN | BODY MASS INDEX: 38.77 KG/M2 | WEIGHT: 247 LBS | DIASTOLIC BLOOD PRESSURE: 79 MMHG | SYSTOLIC BLOOD PRESSURE: 141 MMHG

## 2021-09-01 DIAGNOSIS — Z01.419 PAP SMEAR, AS PART OF ROUTINE GYNECOLOGICAL EXAMINATION: Primary | ICD-10-CM

## 2021-09-01 DIAGNOSIS — Z11.51 SCREENING FOR HUMAN PAPILLOMAVIRUS: ICD-10-CM

## 2021-09-01 DIAGNOSIS — Z11.3 SCREENING FOR VENEREAL DISEASE: ICD-10-CM

## 2021-09-01 PROCEDURE — 99395 PREV VISIT EST AGE 18-39: CPT | Performed by: OBSTETRICS & GYNECOLOGY

## 2021-09-09 LAB
C TRACH RRNA CVX QL NAA+PROBE: NEGATIVE
CYTOLOGIST CVX/VAG CYTO: ABNORMAL
CYTOLOGY CVX/VAG DOC CYTO: ABNORMAL
CYTOLOGY CVX/VAG DOC THIN PREP: ABNORMAL
DX ICD CODE: ABNORMAL
DX ICD CODE: ABNORMAL
LABCORP, 190119: ABNORMAL
Lab: ABNORMAL
N GONORRHOEA RRNA CVX QL NAA+PROBE: NEGATIVE
OTHER STN SPEC: ABNORMAL
PATHOLOGIST CVX/VAG CYTO: ABNORMAL
STAT OF ADQ CVX/VAG CYTO-IMP: ABNORMAL
T VAGINALIS RRNA SPEC QL NAA+PROBE: NEGATIVE

## 2021-09-15 ENCOUNTER — TELEPHONE (OUTPATIENT)
Dept: OBGYN CLINIC | Age: 21
End: 2021-09-15

## 2021-09-15 NOTE — TELEPHONE ENCOUNTER
----- Message from Pk Mattson sent at 9/15/2021 12:04 PM EDT -----    ----- Message -----  From: Kelley Coto MD  Sent: 9/12/2021   2:17 PM EDT  To: Pk Mattson    Please call the patient regarding her abnormal result.   Repeat pap in 6 month with HPV typing

## 2021-09-15 NOTE — TELEPHONE ENCOUNTER
Spoke with the patient and advised her of her pap results and the need to have it repeated in 6 months. Appt scheduled for repeat pap on 03/15/22.

## 2021-09-17 ENCOUNTER — OFFICE VISIT (OUTPATIENT)
Dept: OBGYN CLINIC | Age: 21
End: 2021-09-17
Payer: MEDICAID

## 2021-09-17 VITALS
HEIGHT: 67 IN | SYSTOLIC BLOOD PRESSURE: 142 MMHG | BODY MASS INDEX: 38.84 KG/M2 | DIASTOLIC BLOOD PRESSURE: 70 MMHG | WEIGHT: 247.5 LBS

## 2021-09-17 DIAGNOSIS — R30.0 DYSURIA: ICD-10-CM

## 2021-09-17 DIAGNOSIS — Z11.3 SCREENING FOR VENEREAL DISEASE: ICD-10-CM

## 2021-09-17 DIAGNOSIS — N76.0 ACUTE VAGINITIS: Primary | ICD-10-CM

## 2021-09-17 PROCEDURE — 99213 OFFICE O/P EST LOW 20 MIN: CPT | Performed by: OBSTETRICS & GYNECOLOGY

## 2021-09-17 RX ORDER — NITROFURANTOIN 25; 75 MG/1; MG/1
100 CAPSULE ORAL 2 TIMES DAILY
Qty: 14 CAPSULE | Refills: 0 | Status: SHIPPED | OUTPATIENT
Start: 2021-09-17 | End: 2021-09-24

## 2021-09-17 NOTE — PROGRESS NOTES
Maribel Flaherty is a 24 y.o. female who presents today for the following:  Chief Complaint   Patient presents with    Other     Pt presents with complaints of pain with urination and to discuss pap results //Adele         No Known Allergies    No current outpatient medications on file. No current facility-administered medications for this visit. Past Medical History:   Diagnosis Date    Chlamydial infection 7/15/2020    Genital herpes simplex 7/15/2020    Herpes     LGSIL on Pap smear of cervix 08-22-21    Morbid obesity ([address] 7/15/2020    Vaginal pain 7/15/2020    *Chronic       No past surgical history on file. No family history on file. Social History     Socioeconomic History    Marital status: SINGLE     Spouse name: Not on file    Number of children: Not on file    Years of education: Not on file    Highest education level: Not on file   Occupational History    Not on file   Tobacco Use    Smoking status: Current Every Day Smoker    Smokeless tobacco: Never Used    Tobacco comment: pt smokes black and milds   Substance and Sexual Activity    Alcohol use: Not Currently    Drug use: Not on file    Sexual activity: Yes     Partners: Male     Birth control/protection: Pill   Other Topics Concern    Not on file   Social History Narrative    Not on file     Social Determinants of Health     Financial Resource Strain:     Difficulty of Paying Living Expenses:    Food Insecurity:     Worried About Running Out of Food in the Last Year:     [address] in the Last Year:    Transportation Needs:     Lack of Transportation (Medical):      Lack of Transportation (Non-Medical):    Physical Activity:     Days of Exercise per Week:     Minutes of Exercise per Session:    Stress:     Feeling of Stress :    Social Connections:     Frequency of Communication with Friends and Family:     Frequency of Social Gatherings with Friends and Family:     Attends Rastafari Services:     Active Member of Clubs or Organizations:     Attends Club or Organization Meetings:     Marital Status:    Intimate Partner Violence:     Fear of Current or Ex-Partner:     Emotionally Abused:     Physically Abused:     Sexually Abused:          ROS   Review of Systems   Constitutional: Negative. HENT: Negative. Eyes: Negative. Respiratory: Negative. Cardiovascular: Negative. Gastrointestinal: Negative. Genitourinary: Negative. Musculoskeletal: Negative. Skin: Negative. Neurological: Negative. Endo/Heme/Allergies: Negative. Psychiatric/Behavioral: Negative.       BP (!) 142/70   Ht 5' 7\" (1.702 m)   Wt 247 lb 8 oz (112.3 kg)   LMP 09/05/2021   BMI 38.76 kg/m²    OBGyn Exam   Constitutional  · Appearance: well-nourished, well developed, alert, in no acute distress    HENT  · Head and Face: appears normal    Chest  · Respiratory Effort: breathing labored    Gastrointestinal  · Abdominal Examination: abdomen non-tender to palpation, normal bowel sounds, no masses present    Genitourinary  · External Genitalia: normal appearance for age, no discharge present, no tenderness present, no inflammatory lesions present, no masses present, no atrophy present  · Vagina: normal vaginal vault without central or paravaginal defects, no discharge present, no inflammatory lesions present, no masses present  · Bladder: non-tender to palpation  · Urethra: appears normal  · Cervix: normal   · Uterus: normal size, shape and consistency  · Adnexa: no adnexal tenderness present, no adnexal masses present  · Perineum: perineum within normal limits, no evidence of trauma, no rashes or skin lesions present  · Anus: anus within normal limits, no hemorrhoids present  · Inguinal Lymph Nodes: no lymphadenopathy present    Skin  · General Inspection: no rash, no lesions identified    Neurologic/Psychiatric  · Mental Status:  · Orientation: grossly oriented to person, place and time  · Mood and Affect: mood normal, affect appropriate    No results found for this visit on 09/17/21. Orders Placed This Encounter    NUSWAB VAGINITIS PLUS (VG+) WITH CANDIDA (SIX SPECIES)     25 yo SX/SY OF UTI  WILL SEND URINE CX, ALSO CHECK FOR URETHRITIS    1.  Acute vaginitis    - NUSWAB VAGINITIS PLUS (VG+) WITH CANDIDA (SIX SPECIES)    2. Screening for venereal disease    - NUSWAB VAGINITIS PLUS (VG+) WITH CANDIDA (SIX SPECIES)

## 2021-09-19 LAB — BACTERIA UR CULT: NORMAL

## 2021-09-20 LAB
A VAGINAE DNA VAG QL NAA+PROBE: NORMAL SCORE
BVAB2 DNA VAG QL NAA+PROBE: NORMAL SCORE
C ALBICANS DNA VAG QL NAA+PROBE: NEGATIVE
C GLABRATA DNA VAG QL NAA+PROBE: NEGATIVE
C KRUSEI DNA VAG QL NAA+PROBE: NEGATIVE
C LUSITANIAE DNA VAG QL NAA+PROBE: NEGATIVE
C TRACH DNA VAG QL NAA+PROBE: NEGATIVE
CANDIDA DNA VAG QL NAA+PROBE: NEGATIVE
MEGA1 DNA VAG QL NAA+PROBE: NORMAL SCORE
N GONORRHOEA DNA VAG QL NAA+PROBE: NEGATIVE
T VAGINALIS DNA VAG QL NAA+PROBE: NEGATIVE

## 2021-11-03 ENCOUNTER — TELEPHONE (OUTPATIENT)
Dept: OBGYN CLINIC | Age: 21
End: 2021-11-03

## 2021-11-03 DIAGNOSIS — A60.00 GENITAL HERPES SIMPLEX, UNSPECIFIED SITE: Primary | ICD-10-CM

## 2021-11-03 RX ORDER — ACYCLOVIR 800 MG/1
800 TABLET ORAL 2 TIMES DAILY
Qty: 14 TABLET | Refills: 2 | Status: SHIPPED | OUTPATIENT
Start: 2021-11-03 | End: 2021-11-10

## 2022-02-08 ENCOUNTER — OFFICE VISIT (OUTPATIENT)
Dept: OBGYN CLINIC | Age: 22
End: 2022-02-08
Payer: MEDICAID

## 2022-02-08 VITALS — HEIGHT: 67 IN | WEIGHT: 247 LBS | BODY MASS INDEX: 38.77 KG/M2

## 2022-02-08 DIAGNOSIS — N91.2 AMENORRHEA: ICD-10-CM

## 2022-02-08 DIAGNOSIS — N89.8 VAGINAL IRRITATION: Primary | ICD-10-CM

## 2022-02-08 DIAGNOSIS — N76.0 ACUTE VAGINITIS: ICD-10-CM

## 2022-02-08 PROCEDURE — 99214 OFFICE O/P EST MOD 30 MIN: CPT | Performed by: OBSTETRICS & GYNECOLOGY

## 2022-02-08 RX ORDER — FLUCONAZOLE 150 MG/1
150 TABLET ORAL DAILY
Qty: 1 TABLET | Refills: 0 | Status: SHIPPED | OUTPATIENT
Start: 2022-02-08 | End: 2022-02-09

## 2022-02-08 RX ORDER — CLINDAMYCIN HYDROCHLORIDE 300 MG/1
300 CAPSULE ORAL 3 TIMES DAILY
Qty: 21 CAPSULE | Refills: 0 | Status: SHIPPED | OUTPATIENT
Start: 2022-02-08 | End: 2022-02-15

## 2022-02-08 NOTE — PROGRESS NOTES
Chief Complaint   Patient presents with    Vaginitis     Visit Vitals  Ht 5' 7\" (1.702 m)   Wt 247 lb (112 kg)   LMP 12/26/2021 (Exact Date)   BMI 38.69 kg/m²

## 2022-02-08 NOTE — PROGRESS NOTES
Leonora Johnson is a [de-identified] , 24 y.o. female   Patient's last menstrual period was 12/26/2021 (exact date). She presents for her problem    She is having vaginal irritation and missed her cycle . Menstrual status:  Cycles are usually regular with a 26-32 day interval with 3-7 day duration. Flow: moderate. She does not have dysmenorrhea. Medical conditions:  Since her last annual GYN exam about one year ago, she has not the following changes in her health history: none. Mammogram History:    ANTONIA Results (most recent):  No results found for this or any previous visit. DEXA Results (most recent):  No results found for this or any previous visit. Past Medical History:   Diagnosis Date    Chlamydial infection 7/15/2020    Genital herpes simplex 7/15/2020    Herpes     LGSIL on Pap smear of cervix 08-22-21    Morbid obesity ([address] 7/15/2020    Vaginal irritation 2022    Vaginal pain 7/15/2020    *Chronic     History reviewed. No pertinent surgical history. Prior to Admission medications    Medication Sig Start Date End Date Taking? Authorizing Provider   clindamycin (CLEOCIN) 300 mg capsule Take 1 Capsule by mouth three (3) times daily for 7 days. 2/8/22 2/15/22 Yes Daxa Reilly MD   fluconazole (DIFLUCAN) 150 mg tablet Take 1 Tablet by mouth daily for 1 day. Take after finishing course of antibiotics. FDA advises cautious prescribing of oral fluconazole in pregnancy. 2/8/22 2/9/22 Yes Daxa Reilly MD       No Known Allergies       Tobacco History:  reports that she has been smoking. She has been smoking about 0.25 packs per day. She has never used smokeless tobacco.  Alcohol Abuse:  reports previous alcohol use. Drug Abuse:  reports previous drug use. Family Medical/Cancer History:   Family History   Family history unknown: Yes          Review of Systems   Constitutional: Negative for chills, fever, malaise/fatigue and weight loss.    HENT: Negative for congestion, ear pain, sinus pain and tinnitus. Eyes: Negative for blurred vision and double vision. Respiratory: Negative for cough, shortness of breath and wheezing. Cardiovascular: Negative for chest pain and palpitations. Gastrointestinal: Negative for abdominal pain, blood in stool, constipation, diarrhea, heartburn, nausea and vomiting. Genitourinary: Negative for dysuria, flank pain, frequency, hematuria and urgency. Musculoskeletal: Negative for joint pain and myalgias. Skin: Negative for itching and rash. Neurological: Negative for dizziness, weakness and headaches. Psychiatric/Behavioral: Negative for depression, memory loss and suicidal ideas. The patient is not nervous/anxious and does not have insomnia. Physical Exam  Constitutional:       Appearance: Normal appearance. HENT:      Head: Normocephalic and atraumatic. Abdominal:      General: Abdomen is flat. Palpations: Abdomen is soft. Genitourinary:     General: Normal vulva. Vagina: Vaginal discharge present. Cervix: Normal.      Uterus: Normal.       Adnexa: Right adnexa normal and left adnexa normal.      Rectum: Normal.      Comments: Culture obtained  Neurological:      Mental Status: She is alert. Psychiatric:         Mood and Affect: Mood normal.         Behavior: Behavior normal.         Thought Content: Thought content normal.          Visit Vitals  Ht 5' 7\" (1.702 m)   Wt 247 lb (112 kg)   LMP 12/26/2021 (Exact Date)   BMI 38.69 kg/m²         Assessment: Diagnoses and all orders for this visit:    1. Vaginal irritation  -     NUSWAB VAGINITIS PLUS (VG+) WITH CANDIDA (SIX SPECIES)    2. Acute vaginitis    3. Amenorrhea  -     BETA HCG, QT    Other orders  -     clindamycin (CLEOCIN) 300 mg capsule; Take 1 Capsule by mouth three (3) times daily for 7 days. -     fluconazole (DIFLUCAN) 150 mg tablet; Take 1 Tablet by mouth daily for 1 day. Take after finishing course of antibiotics.  FDA advises cautious prescribing of oral fluconazole in pregnancy.         Plan: Questions addressed, lab and Nu-swab  Counseled re: diet, exercise, healthy lifestyle  Return for Annual

## 2022-02-09 LAB — HCG INTACT+B SERPL-ACNC: <1 MIU/ML

## 2022-02-09 NOTE — PROGRESS NOTES
Tried to call pt with her results, phone busy.   Sent message thru [facility] portal with results

## 2022-02-12 LAB
A VAGINAE DNA VAG QL NAA+PROBE: ABNORMAL SCORE
BVAB2 DNA VAG QL NAA+PROBE: ABNORMAL SCORE
C ALBICANS DNA VAG QL NAA+PROBE: POSITIVE
C GLABRATA DNA VAG QL NAA+PROBE: NEGATIVE
C KRUSEI DNA VAG QL NAA+PROBE: NEGATIVE
C LUSITANIAE DNA VAG QL NAA+PROBE: NEGATIVE
C TRACH DNA VAG QL NAA+PROBE: NEGATIVE
CANDIDA DNA VAG QL NAA+PROBE: NEGATIVE
MEGA1 DNA VAG QL NAA+PROBE: ABNORMAL SCORE
N GONORRHOEA DNA VAG QL NAA+PROBE: NEGATIVE
T VAGINALIS DNA VAG QL NAA+PROBE: NEGATIVE

## 2022-02-14 NOTE — PROGRESS NOTES
Nu-swab results in chart  Scripts sent to the pt's pharmacy at the time of the appt. Message sent thru My Chart to the pt.

## 2022-03-18 PROBLEM — A74.9 CHLAMYDIAL INFECTION: Status: ACTIVE | Noted: 2020-07-15

## 2022-03-18 PROBLEM — R10.2 VAGINAL PAIN: Status: ACTIVE | Noted: 2020-07-15

## 2022-03-18 PROBLEM — A60.00 GENITAL HERPES SIMPLEX: Status: ACTIVE | Noted: 2020-07-15

## 2022-03-19 PROBLEM — E66.01 MORBID OBESITY (HCC): Status: ACTIVE | Noted: 2020-07-15

## 2022-04-26 ENCOUNTER — HOSPITAL ENCOUNTER (EMERGENCY)
Age: 22
Discharge: ANOTHER HOSPITAL | End: 2022-04-27
Attending: EMERGENCY MEDICINE
Payer: MEDICAID

## 2022-04-26 VITALS
DIASTOLIC BLOOD PRESSURE: 80 MMHG | SYSTOLIC BLOOD PRESSURE: 121 MMHG | RESPIRATION RATE: 16 BRPM | WEIGHT: 240 LBS | OXYGEN SATURATION: 99 % | HEIGHT: 66 IN | HEART RATE: 100 BPM | BODY MASS INDEX: 38.57 KG/M2 | TEMPERATURE: 98.8 F

## 2022-04-26 DIAGNOSIS — J02.9 PHARYNGITIS, UNSPECIFIED ETIOLOGY: Primary | ICD-10-CM

## 2022-04-26 PROCEDURE — 99283 EMERGENCY DEPT VISIT LOW MDM: CPT

## 2022-04-26 NOTE — Clinical Note
[facility] EMERGENCY DEPARTMENT  [address] [facility] [id number]  [phone]    Work/School Note    Date: 4/26/2022    To Whom It May concern:      Padmini Carrillo was seen and treated today in the emergency room by the following provider(s):  Attending Provider: Jc Osborn is excused from work/school on 04/27/22. She is clear to return to work/school on 04/28/22.         Sincerely,          Ilya Frias, DO

## 2022-04-27 LAB — DEPRECATED S PYO AG THROAT QL EIA: NEGATIVE

## 2022-04-27 PROCEDURE — 87880 STREP A ASSAY W/OPTIC: CPT

## 2022-04-27 PROCEDURE — 74011000250 HC RX REV CODE- 250: Performed by: EMERGENCY MEDICINE

## 2022-04-27 PROCEDURE — 74011250637 HC RX REV CODE- 250/637: Performed by: EMERGENCY MEDICINE

## 2022-04-27 RX ORDER — TRIPROLIDINE/PSEUDOEPHEDRINE 2.5MG-60MG
400 TABLET ORAL
Status: COMPLETED | OUTPATIENT
Start: 2022-04-27 | End: 2022-04-27

## 2022-04-27 RX ORDER — AMOXICILLIN 875 MG/1
875 TABLET, FILM COATED ORAL 2 TIMES DAILY
Qty: 14 TABLET | Refills: 0 | Status: SHIPPED | OUTPATIENT
Start: 2022-04-27 | End: 2022-05-02

## 2022-04-27 RX ORDER — LIDOCAINE HYDROCHLORIDE 20 MG/ML
15 SOLUTION OROPHARYNGEAL
Status: COMPLETED | OUTPATIENT
Start: 2022-04-27 | End: 2022-04-27

## 2022-04-27 RX ADMIN — IBUPROFEN 400 MG: 100 SUSPENSION ORAL at 00:11

## 2022-04-27 RX ADMIN — LIDOCAINE HYDROCHLORIDE 15 ML: 20 SOLUTION ORAL; TOPICAL at 00:11

## 2022-04-27 NOTE — DISCHARGE INSTRUCTIONS
Thank you! Thank you for allowing me to care for you in the emergency department. I sincerely hope that you are satisfied with your visit today. It is my goal to provide you with excellent care. Below you will find a list of your labs and imaging from your visit today. Should you have any questions regarding these results please do not hesitate to call the emergency department. Labs -     Recent Results (from the past 12 hour(s))   STREP AG SCREEN, GROUP A    Collection Time: 04/27/22 12:00 AM    Specimen: Throat   Result Value Ref Range    Group A Strep Ag ID Negative         Radiologic Studies -   No orders to display     CT Results  (Last 48 hours)      None          CXR Results  (Last 48 hours)      None               If you feel that you have not received excellent quality care or timely care, please ask to speak to the nurse manager. Please choose us in the future for your continued health care needs. ------------------------------------------------------------------------------------------------------------  The exam and treatment you received in the Emergency Department were for an urgent problem and are not intended as complete care. It is important that you follow-up with a doctor, nurse practitioner, or physician assistant to:  (1) confirm your diagnosis,  (2) re-evaluation of changes in your illness and treatment, and  (3) for ongoing care. If your symptoms become worse or you do not improve as expected and you are unable to reach your usual health care provider, you should return to the Emergency Department. We are available 24 hours a day. Please take your discharge instructions with you when you go to your follow-up appointment. If you have any problem arranging a follow-up appointment, contact the Emergency Department immediately. If a prescription has been provided, please have it filled as soon as possible to prevent a delay in treatment.  Read the entire medication instruction sheet provided to you by the pharmacy. If you have any questions or reservations about taking the medication due to side effects or interactions with other medications, please call your primary care physician or contact the ER to speak with the charge nurse. Make an appointment with your family doctor or the physician you were referred to for follow-up of this visit as instructed on your discharge paperwork, as this is a mandatory follow-up. Return to the ER if you are unable to be seen or if you are unable to be seen in a timely manner. If you have any problem arranging the follow-up visit, contact the Emergency Department immediately.

## 2022-04-27 NOTE — ED PROVIDER NOTES
EMERGENCY DEPARTMENT HISTORY AND PHYSICAL EXAM      Date: 4/26/2022  Patient Name: Marga Almaguer    History of Presenting Illness     Chief Complaint   Patient presents with    Sore Throat    Ear Pain       History Provided By: Patient    HPI: Marga Almaguer, 24 y.o. female with a past medical history significant No significant past medical history presents to the ED with cc of 2 days of sore throat. Patient reports no nasal congestion, cough, ear pain, fever. She states pain is made worse with p.o. intake. She has not tried anything for her symptoms. Patient reports seeing white spots on her tonsils. There are no other complaints, changes, or physical findings at this time. PCP: Lennox Cordon, MD    No current facility-administered medications on file prior to encounter. No current outpatient medications on file prior to encounter. Past History     Past Medical History:  Past Medical History:   Diagnosis Date    Chlamydial infection 7/15/2020    Genital herpes simplex 7/15/2020    Herpes     LGSIL on Pap smear of cervix 08-22-21    Morbid obesity ([facility] [address] 7/15/2020    Vaginal irritation 2022    Vaginal pain 7/15/2020    *Chronic       Past Surgical History:  History reviewed. No pertinent surgical history. Family History:  Family History   Family history unknown: Yes       Social History:  Social History     Tobacco Use    Smoking status: Current Every Day Smoker     Packs/day: 0.25    Smokeless tobacco: Never Used    Tobacco comment: pt smokes black and milds   Vaping Use    Vaping Use: Never used   Substance Use Topics    Alcohol use: Not Currently    Drug use: Not Currently       Allergies:  No Known Allergies      Review of Systems     Review of Systems   Constitutional: Negative for chills and fever. HENT: Positive for sore throat. Negative for congestion. Eyes: Negative for pain and visual disturbance.    Respiratory: Negative for cough and shortness of breath. Cardiovascular: Negative for chest pain and palpitations. Gastrointestinal: Negative for constipation, diarrhea, nausea and vomiting. Genitourinary: Negative for dysuria and frequency. Musculoskeletal: Negative for arthralgias and myalgias. Skin: Negative for color change and rash. Neurological: Negative for dizziness, weakness, light-headedness and headaches. Psychiatric/Behavioral: Negative for dysphoric mood and sleep disturbance. Physical Exam     Physical Exam  Constitutional:       Appearance: Normal appearance. HENT:      Head: Normocephalic and atraumatic. Right Ear: External ear normal.      Left Ear: External ear normal.      Nose: Nose normal.      Mouth/Throat:      Mouth: Mucous membranes are moist.      Tonsils: Tonsillar exudate present. Eyes:      Extraocular Movements: Extraocular movements intact. Conjunctiva/sclera: Conjunctivae normal.   Cardiovascular:      Rate and Rhythm: Normal rate and regular rhythm. Pulses: Normal pulses. Heart sounds: Normal heart sounds. Pulmonary:      Effort: Pulmonary effort is normal.      Breath sounds: Normal breath sounds. Abdominal:      General: Abdomen is flat. There is no distension. Palpations: Abdomen is soft. Tenderness: There is no abdominal tenderness. Musculoskeletal:         General: Normal range of motion. Cervical back: Normal range of motion. Skin:     General: Skin is warm and dry. Capillary Refill: Capillary refill takes less than 2 seconds. Neurological:      General: No focal deficit present. Mental Status: She is alert and oriented to person, place, and time. Mental status is at baseline.    Psychiatric:         Mood and Affect: Mood normal.         Behavior: Behavior normal.         Lab and Diagnostic Study Results     Labs -     Recent Results (from the past 12 hour(s))   STREP AG SCREEN, GROUP A    Collection Time: 04/27/22 12:00 AM    Specimen: Throat Result Value Ref Range    Group A Strep Ag ID Negative         Radiologic Studies -   @lastxrresult@  CT Results  (Last 48 hours)    None        CXR Results  (Last 48 hours)    None            Medical Decision Making   - I am the first provider for this patient. - I reviewed the vital signs, available nursing notes, past medical history, past surgical history, family history and social history. - Initial assessment performed. The patients presenting problems have been discussed, and they are in agreement with the care plan formulated and outlined with them. I have encouraged them to ask questions as they arise throughout their visit. Vital Signs-Reviewed the patient's vital signs. Patient Vitals for the past 12 hrs:   Temp Pulse Resp BP SpO2   04/26/22 2347 -- -- -- 121/80 --   04/26/22 2345 98.8 °F (37.1 °C) 100 16 -- 99 %       Records Reviewed: Nursing Notes    The patient presents with sore throat with a differential diagnosis of viral pharyngitis, strep pharyngitis, RPA, PTA      ED Course:          Provider Notes (Medical Decision Making):   77-year-old otherwise healthy female presenting to the ED for evaluation of 2 days of sore throat. On physical exam, patient with mild bilateral tonsillar exudate. Rapid strep negative, however will opt to treat with antibiotics based on physical exam.  Patient with subjective improvement of her sore throat with liquid Motrin and viscous lidocaine in the ED. Stable for discharge home at this time. MDM       Procedures   Medical Decision Makingedical Decision Making  Performed by: Vahe Michele DO  PROCEDURES:  Procedures       Disposition   Disposition: Condition stable  DC- Adult Discharges: All of the diagnostic tests were reviewed and questions answered. Diagnosis, care plan and treatment options were discussed. The patient understands the instructions and will follow up as directed. The patients results have been reviewed with them.   They have been counseled regarding their diagnosis. The patient verbally convey understanding and agreement of the signs, symptoms, diagnosis, treatment and prognosis and additionally agrees to follow up as recommended with their PCP in 24 - 48 hours. They also agree with the care-plan and convey that all of their questions have been answered. I have also put together some discharge instructions for them that include: 1) educational information regarding their diagnosis, 2) how to care for their diagnosis at home, as well a 3) list of reasons why they would want to return to the ED prior to their follow-up appointment, should their condition change. DC-The patient was given verbal sore throat instructions    Discharged    DISCHARGE PLAN:  1. Cannot display discharge medications since this patient is not currently admitted. 2.   Follow-up Information     Follow up With Specialties Details Why Contact [address] Emergency Medicine  As needed, If symptoms worsen [address] [place] [id number] [phone]    Deveron Kayser, MD Internal Medicine Schedule an appointment as soon as possible for a visit   [address] [place] [address],[address], [facility]  [phone]          3. Return to ED if worse   4. Discharge Medication List as of 4/27/2022 12:36 AM            Diagnosis     Clinical Impression:   1. Pharyngitis, unspecified etiology        Attestations:    Jesus Manuel Scott, DO    Please note that this dictation was completed with [employer], the computer voice recognition software. Quite often unanticipated grammatical, syntax, homophones, and other interpretive errors are inadvertently transcribed by the computer software. Please disregard these errors. Please excuse any errors that have escaped final proofreading. Thank you.

## 2022-05-02 ENCOUNTER — OFFICE VISIT (OUTPATIENT)
Dept: OBGYN CLINIC | Age: 22
End: 2022-05-02
Payer: MEDICAID

## 2022-05-02 VITALS
WEIGHT: 242.25 LBS | HEIGHT: 66 IN | SYSTOLIC BLOOD PRESSURE: 114 MMHG | BODY MASS INDEX: 38.93 KG/M2 | DIASTOLIC BLOOD PRESSURE: 70 MMHG

## 2022-05-02 DIAGNOSIS — Z11.3 SCREENING FOR VENEREAL DISEASE: ICD-10-CM

## 2022-05-02 DIAGNOSIS — N89.8 VAGINAL IRRITATION: Primary | ICD-10-CM

## 2022-05-02 PROCEDURE — 99214 OFFICE O/P EST MOD 30 MIN: CPT | Performed by: OBSTETRICS & GYNECOLOGY

## 2022-05-02 RX ORDER — FLUCONAZOLE 150 MG/1
TABLET ORAL
Qty: 2 TABLET | Refills: 0 | Status: SHIPPED | OUTPATIENT
Start: 2022-05-02

## 2022-05-02 NOTE — PROGRESS NOTES
Norberto Cabrera is a [de-identified] , 24 y.o. female   Patient's last menstrual period was 04/08/2022 (exact date). She presents for her problem    She is having vaginal d/c with internal itching- finished a course of abxs recently, also DWP pregnancy and reasons for not getting- questions addressed, not actively trying, also request STD check. Menstrual status:  Cycles are usually regular with a 26-32 day interval with 3-7 day duration. Flow: moderate. She does not have dysmenorrhea. Medical conditions:  Since her last annual GYN exam about one year ago, she has not the following changes in her health history: none. Mammogram History:    ANTONIA Results (most recent):  No results found for this or any previous visit. DEXA Results (most recent):  No results found for this or any previous visit. Past Medical History:   Diagnosis Date    Chlamydial infection 7/15/2020    Genital herpes simplex 7/15/2020    Herpes     LGSIL on Pap smear of cervix 08-22-21    Morbid obesity ([address] 7/15/2020    Vaginal irritation 2022    Vaginal irritation 05/02/2022    Vaginal pain 7/15/2020    *Chronic     History reviewed. No pertinent surgical history. Prior to Admission medications    Medication Sig Start Date End Date Taking? Authorizing Provider   fluconazole (DIFLUCAN) 150 mg tablet Take 1 tablet now and again in 4 days. FDA advises cautious prescribing of oral fluconazole in pregnancy. 5/2/22  Yes Jacque Ortega MD       No Known Allergies       Tobacco History:  reports that she has been smoking. She has been smoking about 0.25 packs per day. She has never used smokeless tobacco.  Alcohol use:  reports previous alcohol use. Drug use:  reports previous drug use. Family Medical/Cancer History:   Family History   Family history unknown: Yes          Review of Systems   Constitutional: Negative for chills, fever, malaise/fatigue and weight loss.    HENT: Negative for congestion, ear pain, sinus pain and tinnitus. Eyes: Negative for blurred vision and double vision. Respiratory: Negative for cough, shortness of breath and wheezing. Cardiovascular: Negative for chest pain and palpitations. Gastrointestinal: Negative for abdominal pain, blood in stool, constipation, diarrhea, heartburn, nausea and vomiting. Genitourinary: Negative for dysuria, flank pain, frequency, hematuria and urgency. Musculoskeletal: Negative for joint pain and myalgias. Skin: Negative for itching and rash. Neurological: Negative for dizziness, weakness and headaches. Psychiatric/Behavioral: Negative for depression, memory loss and suicidal ideas. The patient is not nervous/anxious and does not have insomnia. Physical Exam  Constitutional:       Appearance: Normal appearance. HENT:      Head: Normocephalic and atraumatic. Abdominal:      General: Abdomen is flat. Palpations: Abdomen is soft. Genitourinary:     General: Normal vulva. Vagina: Vaginal discharge present. Cervix: Normal.      Uterus: Normal.       Adnexa: Right adnexa normal and left adnexa normal.      Rectum: Normal.      Comments: Culture obtained  Neurological:      Mental Status: She is alert. Psychiatric:         Mood and Affect: Mood normal.         Behavior: Behavior normal.         Thought Content: Thought content normal.          Visit Vitals  /70 (BP 1 Location: Left upper arm, BP Patient Position: Sitting, BP Cuff Size: Large adult)   Ht 5' 6\" (1.676 m)   Wt 242 lb 4 oz (109.9 kg)   LMP 04/08/2022 (Exact Date)   BMI 39.10 kg/m²         Assessment: Diagnoses and all orders for this visit:    1. Vaginal irritation  -     NUSWAB VAGINITIS PLUS (VG+) WITH CANDIDA (SIX SPECIES)    2. Screening for venereal disease  -     HEP B SURFACE AG  -     RPR  -     HIV 1/2 AG/AB, 4TH GENERATION,W RFLX CONFIRM  -     HEPATITIS C AB    Other orders  -     fluconazole (DIFLUCAN) 150 mg tablet;  Take 1 tablet now and again in 4 days. FDA advises cautious prescribing of oral fluconazole in pregnancy.         Plan: Questions addressed  Counseled re: diet, exercise, healthy lifestyle  Return for Annual

## 2022-05-02 NOTE — PROGRESS NOTES
Chief Complaint   Patient presents with    Vaginitis     completed amoxicillin 2 days ago     Visit Vitals  /70 (BP 1 Location: Left upper arm, BP Patient Position: Sitting, BP Cuff Size: Large adult)   Ht 5' 6\" (1.676 m)   Wt 242 lb 4 oz (109.9 kg)   LMP 04/08/2022 (Exact Date)   BMI 39.10 kg/m²

## 2022-05-10 RX ORDER — CLINDAMYCIN HYDROCHLORIDE 300 MG/1
300 CAPSULE ORAL 3 TIMES DAILY
Qty: 21 CAPSULE | Refills: 0 | Status: SHIPPED | OUTPATIENT
Start: 2022-05-10 | End: 2022-05-17

## 2022-05-10 RX ORDER — FLUCONAZOLE 150 MG/1
150 TABLET ORAL DAILY
Qty: 1 TABLET | Refills: 0 | Status: SHIPPED | OUTPATIENT
Start: 2022-05-10 | End: 2022-05-11

## 2022-08-02 ENCOUNTER — OFFICE VISIT (OUTPATIENT)
Dept: INTERNAL MEDICINE CLINIC | Age: 22
End: 2022-08-02
Payer: MEDICAID

## 2022-08-02 VITALS
SYSTOLIC BLOOD PRESSURE: 122 MMHG | WEIGHT: 240 LBS | OXYGEN SATURATION: 93 % | BODY MASS INDEX: 38.57 KG/M2 | HEART RATE: 91 BPM | RESPIRATION RATE: 12 BRPM | HEIGHT: 66 IN | DIASTOLIC BLOOD PRESSURE: 73 MMHG

## 2022-08-02 DIAGNOSIS — F33.1 MODERATE EPISODE OF RECURRENT MAJOR DEPRESSIVE DISORDER (HCC): Primary | ICD-10-CM

## 2022-08-02 DIAGNOSIS — R63.2 OVEREATING: ICD-10-CM

## 2022-08-02 DIAGNOSIS — E66.09 CLASS 2 OBESITY DUE TO EXCESS CALORIES WITHOUT SERIOUS COMORBIDITY WITH BODY MASS INDEX (BMI) OF 38.0 TO 38.9 IN ADULT: ICD-10-CM

## 2022-08-02 PROCEDURE — 99204 OFFICE O/P NEW MOD 45 MIN: CPT | Performed by: INTERNAL MEDICINE

## 2022-08-02 RX ORDER — BUPROPION HYDROCHLORIDE 150 MG/1
150 TABLET ORAL
Qty: 90 TABLET | Refills: 0 | Status: SHIPPED | OUTPATIENT
Start: 2022-08-02 | End: 2022-10-31

## 2022-08-02 RX ORDER — PHENTERMINE HYDROCHLORIDE 37.5 MG/1
37.5 TABLET ORAL
Qty: 30 TABLET | Refills: 2 | Status: SHIPPED | OUTPATIENT
Start: 2022-08-02 | End: 2022-10-31

## 2022-08-02 NOTE — PROGRESS NOTES
Chief Complaint   Patient presents with    Establish Care     Visit Vitals  /73 (BP 1 Location: Left upper arm, BP Patient Position: Sitting, BP Cuff Size: Large adult)   Pulse 91   Resp 12   Ht 5' 6\" (1.676 m)   Wt 240 lb (108.9 kg)   LMP 07/12/2022   SpO2 93%   BMI 38.74 kg/m²       1. \"Have you been to the ER, urgent care clinic since your last visit? Hospitalized since your last visit? \" No    2. \"Have you seen or consulted any other health care providers outside of the [address] [place] [address] since your last visit? \" No     3. For patients aged 39-70: Has the patient had a colonoscopy / FIT/ Cologuard? NA - based on age      If the patient is female:    4. For patients aged 41-77: Has the patient had a mammogram within the past 2 years? NA - based on age or sex      11. For patients aged 21-65: Has the patient had a pap smear?  Yes - no Care Gap present

## 2022-08-02 NOTE — PROGRESS NOTES
China Dutton is a 25 y.o. female and presents with [employer]O [facility] may concern is weight gain. She says she has gained around 20 lbs in the past 3 months. She says she overeats. She says she eats a lot of fast food, she snacks in junk food, like chips, she says she thinks her problem is more the starches, she doesn't like desserts or sweets, but she eats at least 6 to 7 bags of chips a day. She says her back is not hurting and she's very mad at hersel for the weight gain. She says in the past few months she has been feeling depressed, no specific trigger, she feeels one determinant is her weight, she feels hopeless, worthless around 3 days a week. She lives with her mom, she says her realtionship with her is \"ok\"    She works in a [email or web address] center 8 hours a day 5 days a week, she says she's sitting down all the time, not exercising. Review of Systems  Review of Systems   Constitutional:  Negative for chills, fatigue, fever and unexpected weight change. HENT:  Negative for congestion, ear pain, sneezing and sore throat. Eyes:  Negative for pain and discharge. Respiratory:  Negative for cough, shortness of breath and wheezing. Cardiovascular:  Negative for chest pain, palpitations and leg swelling. Gastrointestinal:  Negative for abdominal pain, blood in stool, constipation and diarrhea. Endocrine: Negative for polydipsia and polyuria. Genitourinary:  Negative for difficulty urinating, dysuria, frequency, hematuria and urgency. Musculoskeletal:  Negative for arthralgias, back pain and joint swelling. Skin:  Negative for rash. Allergic/Immunologic: Negative for environmental allergies and food allergies. Neurological:  Negative for dizziness, speech difficulty, weakness, light-headedness, numbness and headaches. Hematological:  Negative for adenopathy. Psychiatric/Behavioral:  Negative for behavioral problems (Depression), sleep disturbance and suicidal ideas. Past Medical History:   Diagnosis Date    Chlamydial infection 7/15/2020    Genital herpes simplex 7/15/2020    Herpes     LGSIL on Pap smear of cervix 08-22-21    Morbid obesity ([address] 7/15/2020    Vaginal irritation 2022    Vaginal irritation 05/02/2022    Vaginal pain 7/15/2020    *Chronic     History reviewed. No pertinent surgical history. Social History     Socioeconomic History    Marital status: SINGLE   Tobacco Use    Smoking status: Every Day     Packs/day: 0.25     Types: Cigarettes    Smokeless tobacco: Never    Tobacco comments:     pt smokes black and milds   Vaping Use    Vaping Use: Never used   Substance and Sexual Activity    Alcohol use: Not Currently    Drug use: Not Currently    Sexual activity: Yes     Partners: Male     Birth control/protection: None     Social Determinants of Health     Financial Resource Strain: Low Risk     Difficulty of Paying Living Expenses: Not hard at all   Food Insecurity: No Food Insecurity    Worried About Running Out of Food in the Last Year: Never true    Ran Out of Food in the Last Year: Never true   Physical Activity: Inactive    Days of Exercise per Week: 0 days    Minutes of Exercise per Session: 0 min     Family History   Family history unknown: Yes     Current Outpatient Medications   Medication Sig Dispense Refill    buPROPion XL (WELLBUTRIN XL) 150 mg tablet Take 1 Tablet by mouth every morning for 90 days. Indications: major depressive disorder 90 Tablet 0    phentermine (ADIPEX-P) 37.5 mg tablet Take 1 Tablet by mouth every morning for 90 days. Max Daily Amount: 37.5 mg. 30 Tablet 2    fluconazole (DIFLUCAN) 150 mg tablet Take 1 tablet now and again in 4 days. FDA advises cautious prescribing of oral fluconazole in pregnancy.  (Patient not taking: Reported on 8/2/2022) 2 Tablet 0     No Known Allergies    Objective:  Visit Vitals  /73 (BP 1 Location: Left upper arm, BP Patient Position: Sitting, BP Cuff Size: Large adult)   Pulse 91   Resp 12 Ht 5' 6\" (1.676 m)   Wt 240 lb (108.9 kg)   LMP 07/12/2022   SpO2 93%   BMI 38.74 kg/m²     Physical Exam:   Physical Exam  Constitutional:       General: She is not in acute distress. Appearance: Normal appearance. She is obese. HENT:      Head: Normocephalic and atraumatic. Mouth/Throat:      Mouth: Mucous membranes are moist.   Eyes:      Extraocular Movements: Extraocular movements intact. Conjunctiva/sclera: Conjunctivae normal.      Pupils: Pupils are equal, round, and reactive to light. Cardiovascular:      Rate and Rhythm: Normal rate and regular rhythm. Pulses: Normal pulses. Heart sounds: Normal heart sounds. Pulmonary:      Effort: Pulmonary effort is normal.      Breath sounds: Normal breath sounds. Abdominal:      General: Abdomen is flat. Bowel sounds are normal. There is no distension. Palpations: Abdomen is soft. There is no mass. Tenderness: There is no abdominal tenderness. Musculoskeletal:         General: No swelling or deformity. Cervical back: Normal range of motion and neck supple. Right lower leg: No edema. Left lower leg: No edema. Lymphadenopathy:      Cervical: No cervical adenopathy. Skin:     General: Skin is warm and dry. Capillary Refill: Capillary refill takes less than 2 seconds. Coloration: Skin is not jaundiced or pale. Findings: No erythema or rash. Neurological:      General: No focal deficit present. Mental Status: She is alert and oriented to person, place, and time. Psychiatric:         Mood and Affect: Mood normal.         Behavior: Behavior normal.         Thought Content:  Thought content normal.         Judgment: Judgment normal.        Results for orders placed or performed in visit on 05/02/22   [facility] VAGINITIS PLUS (VG+) WITH CANDIDA (SIX SPECIES)   Result Value Ref Range    Atopobium vaginae High - 2 (A) Score    BVAB 2 Moderate - 1 Score    Megasphaera 1 High - 2 (A) Score    C. albicans, KACI Positive (A) Negative    C. glabrata, KACI Negative Negative    C PARAPSILOSIS/TROPICALIS Negative Negative    Candida lusitaniae Negative Negative    Candida krusei Negative Negative    T. vaginalis, KACI Negative Negative    C. trachomatis, KACI Negative Negative    N. gonorrhoeae, KACI Negative Negative       Assessment/Plan:    And discussed about treatment options for depression, importance of counseling. We will start her on bupropion considering this will help also with the overeating, and we can do a trial of appetite suppressant as well. Splitting her dose is going to be for short-term and educated on potential side effects. Gave her resources for counseling in the area. Discussed about safety plan. 1200 calorie diet given, recommended brisk walking 30 to 40 mins. goal to lose 5% of her weight in 3 months. ICD-10-CM ICD-9-CM    1. Moderate episode of recurrent major depressive disorder (HCC)  F33.1 296.32 buPROPion XL (WELLBUTRIN XL) 150 mg tablet      2. Overeating  R63.2 783.6 buPROPion XL (WELLBUTRIN XL) 150 mg tablet      phentermine (ADIPEX-P) 37.5 mg tablet      3. Class 2 obesity due to excess calories without serious comorbidity with body mass index (BMI) of 38.0 to 38.9 in adult  E66.09 278.00 phentermine (ADIPEX-P) 37.5 mg tablet    Z68.38 V85.38 CBC WITH AUTOMATED DIFF      METABOLIC PANEL, COMPREHENSIVE      TSH 3RD GENERATION        Orders Placed This Encounter    CBC WITH AUTOMATED DIFF    METABOLIC PANEL, COMPREHENSIVE    TSH 3RD GENERATION    buPROPion XL (WELLBUTRIN XL) 150 mg tablet     Sig: Take 1 Tablet by mouth every morning for 90 days. Indications: major depressive disorder     Dispense:  90 Tablet     Refill:  0    phentermine (ADIPEX-P) 37.5 mg tablet     Sig: Take 1 Tablet by mouth every morning for 90 days.  Max Daily Amount: 37.5 mg.     Dispense:  30 Tablet     Refill:  2     lose weight, increase physical activity, follow low fat diet, follow low salt diet, routine labs ordered, call if any problems  There are no Patient Instructions on file for this visit. Follow-up and Dispositions    Return in about 3 months (around 11/2/2022).

## 2022-08-03 ENCOUNTER — TELEPHONE (OUTPATIENT)
Dept: INTERNAL MEDICINE CLINIC | Age: 22
End: 2022-08-03

## 2022-08-03 NOTE — TELEPHONE ENCOUNTER
Medication: phentermine (ADIPEX-P) 37.5 mg tablet   Dosage: Take on tablet each morning   Ordering Provider: pam     Question/Problem: Pharmacy said was not covered. Is prior auth needed? Please f/u. Pharmacy: [place] [id number] [address]   AT [facility] [address] [facility] [address]       Can you please do a Prior Auth on the patient's phentermine.

## 2022-08-30 ENCOUNTER — OFFICE VISIT (OUTPATIENT)
Dept: OBGYN CLINIC | Age: 22
End: 2022-08-30
Payer: MEDICAID

## 2022-08-30 VITALS
SYSTOLIC BLOOD PRESSURE: 137 MMHG | BODY MASS INDEX: 37 KG/M2 | WEIGHT: 230.25 LBS | DIASTOLIC BLOOD PRESSURE: 85 MMHG | HEIGHT: 66 IN

## 2022-08-30 DIAGNOSIS — N76.0 ACUTE VAGINITIS: ICD-10-CM

## 2022-08-30 DIAGNOSIS — N76.0 RECURRENT VAGINITIS: ICD-10-CM

## 2022-08-30 DIAGNOSIS — Z11.3 SCREENING FOR VENEREAL DISEASE: ICD-10-CM

## 2022-08-30 DIAGNOSIS — R87.612 LGSIL ON PAP SMEAR OF CERVIX: Primary | ICD-10-CM

## 2022-08-30 DIAGNOSIS — N97.0 ANOVULATION: ICD-10-CM

## 2022-08-30 DIAGNOSIS — N94.6 DYSMENORRHEA: ICD-10-CM

## 2022-08-30 PROCEDURE — 99214 OFFICE O/P EST MOD 30 MIN: CPT | Performed by: OBSTETRICS & GYNECOLOGY

## 2022-08-30 NOTE — PROGRESS NOTES
Anne-Marie Kim is a 25 y.o. female who presents today for the following:  Chief Complaint   Patient presents with    Abnormal Pap Smear     Pt presents for 6 month repeat pap, LGSIL, with complaints of vaginal discharge with odor//MKeeley     Vaginitis        No Known Allergies    Current Outpatient Medications   Medication Sig    buPROPion XL (WELLBUTRIN XL) 150 mg tablet Take 1 Tablet by mouth every morning for 90 days. Indications: major depressive disorder    phentermine (ADIPEX-P) 37.5 mg tablet Take 1 Tablet by mouth every morning for 90 days. Max Daily Amount: 37.5 mg.    fluconazole (DIFLUCAN) 150 mg tablet Take 1 tablet now and again in 4 days. FDA advises cautious prescribing of oral fluconazole in pregnancy. (Patient not taking: Reported on 8/2/2022)     No current facility-administered medications for this visit. Past Medical History:   Diagnosis Date    Chlamydial infection 7/15/2020    Genital herpes simplex 7/15/2020    Herpes     LGSIL on Pap smear of cervix 08-22-21    Morbid obesity ([address] 7/15/2020    Vaginal irritation 2022    Vaginal irritation 05/02/2022    Vaginal pain 7/15/2020    *Chronic       History reviewed. No pertinent surgical history.     Family History   Family history unknown: Yes       Social History     Socioeconomic History    Marital status: SINGLE     Spouse name: Not on file    Number of children: Not on file    Years of education: Not on file    Highest education level: Not on file   Occupational History    Not on file   Tobacco Use    Smoking status: Every Day     Packs/day: 0.25     Types: Cigarettes    Smokeless tobacco: Never    Tobacco comments:     pt smokes black and milds   Vaping Use    Vaping Use: Never used   Substance and Sexual Activity    Alcohol use: Not Currently    Drug use: Not Currently    Sexual activity: Yes     Partners: Male     Birth control/protection: None   Other Topics Concern    Not on file   Social History Narrative    Not on file     Social Determinants of Health     Financial Resource Strain: Low Risk     Difficulty of Paying Living Expenses: Not hard at all   Food Insecurity: No Food Insecurity    Worried About Running Out of Food in the Last Year: Never true    Ran Out of Food in the Last Year: Never true   Transportation Needs: Not on file   Physical Activity: Inactive    Days of Exercise per Week: 0 days    Minutes of Exercise per Session: 0 min   Stress: Not on file   Social Connections: Not on file   Intimate Partner Violence: Not At Risk    Fear of Current or Ex-Partner: No    Emotionally Abused: No    Physically Abused: No    Sexually Abused: No   Housing Stability: Not on file         ROS   Review of Systems   Constitutional: Negative. HENT: Negative. Eyes: Negative. Respiratory: Negative. Cardiovascular: Negative. Gastrointestinal: Negative. Genitourinary: Negative. Musculoskeletal: Negative. Skin: Negative. Neurological: Negative. Endo/Heme/Allergies: Negative. Psychiatric/Behavioral: Negative. /85   Ht 5' 6\" (1.676 m)   Wt 230 lb 4 oz (104.4 kg)   LMP 08/17/2022   BMI 37.16 kg/m²    OBGyn Exam   Constitutional  Appearance: well-nourished, well developed, alert, in no acute distress    HENT  Head and Face: appears normal    Neck  Inspection/Palpation: normal appearance, no masses or tenderness  Lymph Nodes: no lymphadenopathy present  Thyroid: gland size normal, nontender, no nodules or masses present on palpation    Breasts  Symmetric, no palpable masses, no tenderness, no skin changes, no nipple abnormality, no nipple discharge, no lymphadenopathy. Chest  Respiratory Effort: breathing labored  Auscultation: normal breath sounds    Cardiovascular  Heart:   Auscultation: regular rate and rhythm without murmur    Gastrointestinal  Abdominal Examination: abdomen non-tender to palpation, normal bowel sounds, no masses present  Liver and spleen: no hepatomegaly present, spleen not palpable  Hernias: no hernias identified    Genitourinary  External Genitalia: normal appearance for age, no discharge present, no tenderness present, no inflammatory lesions present, no masses present, no atrophy present  Vagina: normal vaginal vault without central or paravaginal defects, no discharge present, no inflammatory lesions present, no masses present  Bladder: non-tender to palpation  Urethra: appears normal  Cervix: normal   Uterus: normal size, shape and consistency  Adnexa: no adnexal tenderness present, no adnexal masses present  Perineum: perineum within normal limits, no evidence of trauma, no rashes or skin lesions present  Anus: anus within normal limits, no hemorrhoids present  Inguinal Lymph Nodes: no lymphadenopathy present    Skin  General Inspection: no rash, no lesions identified    Neurologic/Psychiatric  Mental Status:  Orientation: grossly oriented to person, place and time  Mood and Affect: mood normal, affect appropriate    No results found for this visit on 08/30/22. Orders Placed This Encounter    NUSWAB VAGINITIS PLUS (VG+) WITH CANDIDA (SIX SPECIES)    PROGESTERONE     TO BE COLLECTED ON SEPT 6TH    PAP IG, APTIMA HPV AND RFX 16/18,45 ([id number]) ([facility])     Order Specific Question:   Pap Source? Answer:   Cervical     Order Specific Question:   Total Hysterectomy? Answer:   No     Order Specific Question:   Supracervical Hysterectomy? Answer:   No     Order Specific Question:   Post Menopausal?     Answer:   No     Order Specific Question:   Hormone Therapy? Answer:   No     Order Specific Question:   IUD? Answer:   No     Order Specific Question:   Abnormal Bleeding? Answer:   No     Order Specific Question:   Pregnant     Answer:   No     Order Specific Question:   Post Partum? Answer:   No     21 YO COMING TO DISCUSS MULTIPLE ISSUES    1.  LGSIL on Pap smear of cervix  - HX OF LGSIL, THIS IS 6 MONTH REPEAT  - PAP IG, APTIMA HPV AND RFX 16/18,45 ([id number])    2. Screening for venereal disease    - NUSWAB VAGINITIS PLUS (VG+) WITH CANDIDA (SIX SPECIES)    3. Acute vaginitis    - NUSWAB VAGINITIS PLUS (VG+) WITH CANDIDA (SIX SPECIES)    4. Anovulation  - DESIRE TO DISCUSS CONCEPTION. NOT ISING ANY PROTECTION AND STILL NO PREGNANCY, ACQUIRE ABOUT TESTING AND IF SHE HAS PCOS  - PROGESTERONE  - IF LOW, WILL TEST FOR PCOS  - DISCUSSED CAUSES OF ANOVULATION  5. Dysmenorrhea      6. Recurrent vaginitis  - NUSWAB COLLECTED  - DISCUSSED BORIC ACID  - WILL RTC IN 2 MONTHS FOR REPEAT NUSWAB    40 MIN    Follow-up and Dispositions    Return in about 3 months (around 11/30/2022).

## 2022-09-01 LAB
CYTOLOGIST CVX/VAG CYTO: ABNORMAL
CYTOLOGY CVX/VAG DOC CYTO: ABNORMAL
CYTOLOGY CVX/VAG DOC THIN PREP: ABNORMAL
DX ICD CODE: ABNORMAL
DX ICD CODE: ABNORMAL
HPV I/H RISK 4 DNA CVX QL PROBE+SIG AMP: POSITIVE
Lab: ABNORMAL
OTHER STN SPEC: ABNORMAL
PATHOLOGIST CVX/VAG CYTO: ABNORMAL
STAT OF ADQ CVX/VAG CYTO-IMP: ABNORMAL

## 2022-09-03 LAB
A VAGINAE DNA VAG QL NAA+PROBE: ABNORMAL SCORE
BVAB2 DNA VAG QL NAA+PROBE: ABNORMAL SCORE
C ALBICANS DNA VAG QL NAA+PROBE: NEGATIVE
C GLABRATA DNA VAG QL NAA+PROBE: NEGATIVE
C KRUSEI DNA VAG QL NAA+PROBE: NEGATIVE
C LUSITANIAE DNA VAG QL NAA+PROBE: NEGATIVE
C TRACH DNA VAG QL NAA+PROBE: POSITIVE
CANDIDA DNA VAG QL NAA+PROBE: NEGATIVE
MEGA1 DNA VAG QL NAA+PROBE: ABNORMAL SCORE
N GONORRHOEA DNA VAG QL NAA+PROBE: NEGATIVE
T VAGINALIS DNA VAG QL NAA+PROBE: NEGATIVE

## 2022-09-07 ENCOUNTER — TELEPHONE (OUTPATIENT)
Dept: OBGYN CLINIC | Age: 22
End: 2022-09-07

## 2022-09-07 DIAGNOSIS — A74.9 CHLAMYDIA: Primary | ICD-10-CM

## 2022-09-07 LAB — PROGEST SERPL-MCNC: 7.9 NG/ML

## 2022-09-07 RX ORDER — AZITHROMYCIN 500 MG/1
1000 TABLET, FILM COATED ORAL DAILY
Qty: 2 TABLET | Refills: 0 | Status: SHIPPED | OUTPATIENT
Start: 2022-09-07 | End: 2022-09-08

## 2022-09-07 NOTE — TELEPHONE ENCOUNTER
Spoke with patient and results given and prescription ordered per Dr. Kassy Johnson. Advised patient to have partner treated. We will test her again in 4 weeks.

## 2022-10-07 ENCOUNTER — OFFICE VISIT (OUTPATIENT)
Dept: OBGYN CLINIC | Age: 22
End: 2022-10-07
Payer: MEDICAID

## 2022-10-07 VITALS
HEIGHT: 66 IN | SYSTOLIC BLOOD PRESSURE: 120 MMHG | DIASTOLIC BLOOD PRESSURE: 72 MMHG | WEIGHT: 223.5 LBS | BODY MASS INDEX: 35.92 KG/M2

## 2022-10-07 DIAGNOSIS — A74.9 CHLAMYDIA: Primary | ICD-10-CM

## 2022-10-07 DIAGNOSIS — R87.612 LGSIL ON PAP SMEAR OF CERVIX: ICD-10-CM

## 2022-10-07 PROCEDURE — 57454 BX/CURETT OF CERVIX W/SCOPE: CPT | Performed by: OBSTETRICS & GYNECOLOGY

## 2022-10-07 NOTE — PROGRESS NOTES
Yamile Peguero is a 25 y.o. female who presents today for the following:  Chief Complaint   Patient presents with    Colposcopy     Pt presents for colposcopy, LGSIL HPV POS. Pt also here for ANN-MARIE, POS Chlamydia 8/30/2022//Adele     Sexually Transmitted Disease        No Known Allergies    Current Outpatient Medications   Medication Sig    buPROPion XL (WELLBUTRIN XL) 150 mg tablet Take 1 Tablet by mouth every morning for 90 days. Indications: major depressive disorder    phentermine (ADIPEX-P) 37.5 mg tablet Take 1 Tablet by mouth every morning for 90 days. Max Daily Amount: 37.5 mg.    fluconazole (DIFLUCAN) 150 mg tablet Take 1 tablet now and again in 4 days. FDA advises cautious prescribing of oral fluconazole in pregnancy. (Patient not taking: Reported on 8/2/2022)     No current facility-administered medications for this visit. Past Medical History:   Diagnosis Date    Chlamydial infection 7/15/2020    Genital herpes simplex 7/15/2020    Herpes     LGSIL on Pap smear of cervix 08-22-21    Morbid obesity ([address] 7/15/2020    Vaginal irritation 2022    Vaginal irritation 05/02/2022    Vaginal pain 7/15/2020    *Chronic       History reviewed. No pertinent surgical history.     Family History   Family history unknown: Yes       Social History     Socioeconomic History    Marital status: SINGLE     Spouse name: Not on file    Number of children: Not on file    Years of education: Not on file    Highest education level: Not on file   Occupational History    Not on file   Tobacco Use    Smoking status: Every Day     Packs/day: 0.25     Types: Cigarettes    Smokeless tobacco: Never    Tobacco comments:     pt smokes black and milds   Vaping Use    Vaping Use: Never used   Substance and Sexual Activity    Alcohol use: Not Currently    Drug use: Not Currently    Sexual activity: Yes     Partners: Male     Birth control/protection: None   Other Topics Concern    Not on file   Social History Narrative    Not on file Social Determinants of Health     Financial Resource Strain: Low Risk     Difficulty of Paying Living Expenses: Not hard at all   Food Insecurity: No Food Insecurity    Worried About Running Out of Food in the Last Year: Never true    Ran Out of Food in the Last Year: Never true   Transportation Needs: Not on file   Physical Activity: Inactive    Days of Exercise per Week: 0 days    Minutes of Exercise per Session: 0 min   Stress: Not on file   Social Connections: Not on file   Intimate Partner Violence: Not At Risk    Fear of Current or Ex-Partner: No    Emotionally Abused: No    Physically Abused: No    Sexually Abused: No   Housing Stability: Not on file         ROS   Review of Systems   Constitutional: Negative. HENT: Negative. Eyes: Negative. Respiratory: Negative. Cardiovascular: Negative. Gastrointestinal: Negative. Genitourinary: Negative. Musculoskeletal: Negative. Skin: Negative. Neurological: Negative. Endo/Heme/Allergies: Negative. Psychiatric/Behavioral: Negative.       /72   Ht 5' 6\" (1.676 m)   Wt 223 lb 8 oz (101.4 kg)   BMI 36.07 kg/m²    OBGyn Exam   Constitutional  Appearance: well-nourished, well developed, alert, in no acute distress    HENT  Head and Face: appears normal    Chest  Respiratory Effort: breathing labored    Gastrointestinal  Abdominal Examination: abdomen non-tender to palpation, normal bowel sounds, no masses present    Genitourinary  External Genitalia: normal appearance for age, no discharge present, no tenderness present, no inflammatory lesions present, no masses present, no atrophy present  Vagina: normal vaginal vault without central or paravaginal defects, no discharge present, no inflammatory lesions present, no masses present  Bladder: non-tender to palpation  Urethra: appears normal  Cervix: normal   Uterus: normal size, shape and consistency  Adnexa: no adnexal tenderness present, no adnexal masses present  Perineum: perineum within normal limits, no evidence of trauma, no rashes or skin lesions present  Anus: anus within normal limits, no hemorrhoids present  Inguinal Lymph Nodes: no lymphadenopathy present    Skin  General Inspection: no rash, no lesions identified    Neurologic/Psychiatric  Mental Status:  Orientation: grossly oriented to person, place and time  Mood and Affect: mood normal, affect appropriate    Results for orders placed or performed in visit on 10/07/22   COLPOSC,CERVIX W/ADJ VAG,W/BX & CURRETAG    Impression    COLPO WITH ECC AND ECTO BX        Orders Placed This Encounter    COLPOSC,CERVIX W/ADJ VAG,W/BX & [address] (VG+) WITH CANDIDA (SIX SPECIES)    SURGICAL PATHOLOGY     Order Specific Question:   Type of Specimen and Locations? Answer:   1 oclock     Order Specific Question:   Patient's clinical history:     Answer:   LGSIL    SURGICAL PATHOLOGY     Order Specific Question:   Type of Specimen and Locations? Answer:   ecc     Order Specific Question:   Patient's clinical history:     Answer:   lgsil     21 YO LGSIL COMING FOR COLPO AND ANN-MARIE FOR CHLAMYDIA    1. Chlamydia    - NUSWAB VAGINITIS PLUS (VG+) WITH CANDIDA (SIX SPECIES)    2. LGSIL on Pap smear of cervix    - SURGICAL PATHOLOGY  - SURGICAL PATHOLOGY  - COLPOSC,CERVIX W/ADJ VAG,W/BX & CURRETAG        Procedures:  Colposcopy:  Informed consent Risk, complications, benefits and alternatives discussed with patient, Consent obtained, 30 second time out taken. Pregnancy status negative. Negative Pregnancy Test.  Colposcopy procedure Acetic Acid 4-6% solution applied to cervix, Endocervical Curreting was performed, Biopsy(ies) taken at o'clock. ,Monsels paste applied to biopsy site(S),. Post Colposcopy Hemostasis was assured, Patient tolerated the procedure well, Instructed nothing in vagina for 4-6 days, Stressed importance of strict followup, Discussed importance of NOT SMOKING.   Gardasil discussed in patients under 32years old, discussed that Gardasil (HPV Vaccine) can still offer protection against up to 70% of HPV types tht cause wqrts or cervical cancer. Follow Up: Follow-up and Dispositions    Return in about 6 months (around 4/7/2023).

## 2022-10-11 LAB
CPT DISCLAIMER: NORMAL
DIAGNOSIS SYNOPSIS:: NORMAL
DX ICD CODE: NORMAL
PATH REPORT.FINAL DX SPEC: NORMAL
PATH REPORT.GROSS SPEC: NORMAL
PATH REPORT.RELEVANT HX SPEC: NORMAL
PATH REPORT.SITE OF ORIGIN SPEC: NORMAL
PATHOLOGIST NAME: NORMAL
PAYMENT PROCEDURE: NORMAL

## 2022-10-12 ENCOUNTER — TELEPHONE (OUTPATIENT)
Dept: OBGYN CLINIC | Age: 22
End: 2022-10-12

## 2022-10-13 DIAGNOSIS — N76.0 VAGINITIS AND VULVOVAGINITIS: Primary | ICD-10-CM

## 2022-10-13 RX ORDER — FLUCONAZOLE 150 MG/1
150 TABLET ORAL DAILY
Qty: 1 TABLET | Refills: 1 | Status: SHIPPED | OUTPATIENT
Start: 2022-10-13 | End: 2022-10-14

## 2022-10-13 RX ORDER — METRONIDAZOLE 500 MG/1
500 TABLET ORAL 2 TIMES DAILY
Qty: 14 TABLET | Refills: 0 | Status: SHIPPED | OUTPATIENT
Start: 2022-10-13 | End: 2022-10-20

## 2022-10-14 ENCOUNTER — TELEPHONE (OUTPATIENT)
Dept: OBGYN CLINIC | Age: 22
End: 2022-10-14

## 2022-12-06 DIAGNOSIS — A60.00 GENITAL HERPES SIMPLEX, UNSPECIFIED SITE: ICD-10-CM

## 2022-12-07 RX ORDER — ACYCLOVIR 800 MG/1
TABLET ORAL
Qty: 14 TABLET | Refills: 2 | Status: SHIPPED | OUTPATIENT
Start: 2022-12-07

## 2022-12-14 ENCOUNTER — OFFICE VISIT (OUTPATIENT)
Dept: OBGYN CLINIC | Age: 22
End: 2022-12-14
Payer: MEDICAID

## 2022-12-14 VITALS
DIASTOLIC BLOOD PRESSURE: 74 MMHG | HEIGHT: 66 IN | SYSTOLIC BLOOD PRESSURE: 127 MMHG | WEIGHT: 222.25 LBS | BODY MASS INDEX: 35.72 KG/M2

## 2022-12-14 DIAGNOSIS — B96.89 BV (BACTERIAL VAGINOSIS): Primary | ICD-10-CM

## 2022-12-14 DIAGNOSIS — N76.0 BV (BACTERIAL VAGINOSIS): Primary | ICD-10-CM

## 2022-12-14 PROCEDURE — 99213 OFFICE O/P EST LOW 20 MIN: CPT | Performed by: OBSTETRICS & GYNECOLOGY

## 2022-12-14 NOTE — PROGRESS NOTES
Julie Hodgkins is a 25 y.o. female who presents today for the following:  Chief Complaint   Patient presents with    Follow-up     Pt presents for repeat testing for recurrent BV. Pt states Boric Acid was not covered by insurance so did not take it//MKeeley         No Known Allergies    Current Outpatient Medications   Medication Sig    acyclovir (ZOVIRAX) 800 mg tablet TAKE 1 TABLET BY MOUTH TWICE DAILY FOR 7 DAYS     No current facility-administered medications for this visit. Past Medical History:   Diagnosis Date    Chlamydial infection 7/15/2020    Genital herpes simplex 7/15/2020    Herpes     LGSIL on Pap smear of cervix 08-22-21    Morbid [address] ([facility] [address] 7/15/2020    Vaginal irritation 2022    Vaginal irritation 05/02/2022    Vaginal pain 7/15/2020    *Chronic       History reviewed. No pertinent surgical history.     Family History   Family history unknown: Yes       Social History     Socioeconomic History    Marital status: SINGLE     Spouse name: Not on file    Number of children: Not on file    Years of education: Not on file    Highest education level: Not on file   Occupational History    Not on file   Tobacco Use    Smoking status: Every Day     Packs/day: 0.25     Types: Cigarettes    Smokeless tobacco: Never    Tobacco comments:     pt smokes black and milds   Vaping Use    Vaping Use: Never used   Substance and Sexual Activity    Alcohol use: Not Currently    Drug use: Not Currently    Sexual activity: Yes     Partners: Male     Birth control/protection: None   Other Topics Concern    Not on file   Social History Narrative    Not on file     Social Determinants of Health     Financial Resource Strain: Low Risk     Difficulty of Paying Living Expenses: Not hard at all   Food Insecurity: No Food Insecurity    Worried About Running Out of Food in the Last Year: Never true    Ran Out of Food in the Last Year: Never true   Transportation Needs: Not on file   Physical Activity: Inactive    Days of Exercise per Week: 0 days    Minutes of Exercise per Session: 0 min   Stress: Not on file   Social Connections: Not on file   Intimate Partner Violence: Not At Risk    Fear of Current or Ex-Partner: No    Emotionally Abused: No    Physically Abused: No    Sexually Abused: No   Housing Stability: Not on file         ROS   Review of Systems   Constitutional: Negative. HENT: Negative. Eyes: Negative. Respiratory: Negative. Cardiovascular: Negative. Gastrointestinal: Negative. Genitourinary: Negative. Musculoskeletal: Negative. Skin: Negative. Neurological: Negative. Endo/Heme/Allergies: Negative. Psychiatric/Behavioral: Negative.       /74   Ht 5' 6\" (1.676 m)   Wt 222 lb 4 oz (100.8 kg)   BMI 35.87 kg/m²    OBGyn Exam   Constitutional  Appearance: well-nourished, well developed, alert, in no acute distress    HENT  Head and Face: appears normal    Chest  Respiratory Effort: breathing labored    Gastrointestinal  Abdominal Examination: abdomen non-tender to palpation, normal bowel sounds, no masses present    Genitourinary  External Genitalia: normal appearance for age, no discharge present, no tenderness present, no inflammatory lesions present, no masses present, no atrophy present  Vagina: normal vaginal vault without central or paravaginal defects, no discharge present, no inflammatory lesions present, no masses present  Bladder: non-tender to palpation  Urethra: appears normal  Cervix: normal   Uterus: normal size, shape and consistency  Adnexa: no adnexal tenderness present, no adnexal masses present  Perineum: perineum within normal limits, no evidence of trauma, no rashes or skin lesions present  Anus: anus within normal limits, no hemorrhoids present  Inguinal Lymph Nodes: no lymphadenopathy present    Skin  General Inspection: no rash, no lesions identified    Neurologic/Psychiatric  Mental Status:  Orientation: grossly oriented to person, place and time  Mood and Affect: mood normal, affect appropriate    No results found for this visit on 12/14/22. Orders Placed This Encounter    NUSWAB VAGINITIS PLUS (VG+) WITH CANDIDA (SIX SPECIES)     23 YO WITH RECURRENT BV  - BORIC ACID NOT COVERED    1.  BV (bacterial vaginosis)    - NUSWAB VAGINITIS PLUS (VG+) WITH CANDIDA (SIX SPECIES)

## 2022-12-19 DIAGNOSIS — N76.0 VAGINITIS AND VULVOVAGINITIS: Primary | ICD-10-CM

## 2022-12-19 RX ORDER — FLUCONAZOLE 150 MG/1
150 TABLET ORAL DAILY
Qty: 1 TABLET | Refills: 1 | Status: SHIPPED | OUTPATIENT
Start: 2022-12-19 | End: 2022-12-20

## 2022-12-19 RX ORDER — METRONIDAZOLE 500 MG/1
500 TABLET ORAL 2 TIMES DAILY
Qty: 14 TABLET | Refills: 0 | Status: SHIPPED | OUTPATIENT
Start: 2022-12-19 | End: 2022-12-26

## 2022-12-21 ENCOUNTER — APPOINTMENT (OUTPATIENT)
Dept: GENERAL RADIOLOGY | Age: 22
End: 2022-12-21
Attending: STUDENT IN AN ORGANIZED HEALTH CARE EDUCATION/TRAINING PROGRAM
Payer: COMMERCIAL

## 2022-12-21 ENCOUNTER — HOSPITAL ENCOUNTER (EMERGENCY)
Age: 22
Discharge: HOME | End: 2022-12-21
Attending: STUDENT IN AN ORGANIZED HEALTH CARE EDUCATION/TRAINING PROGRAM
Payer: COMMERCIAL

## 2022-12-21 VITALS
WEIGHT: 220 LBS | TEMPERATURE: 98.9 F | SYSTOLIC BLOOD PRESSURE: 112 MMHG | DIASTOLIC BLOOD PRESSURE: 76 MMHG | OXYGEN SATURATION: 100 % | BODY MASS INDEX: 35.36 KG/M2 | RESPIRATION RATE: 20 BRPM | HEART RATE: 82 BPM | HEIGHT: 66 IN
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
## 2022-12-21 DIAGNOSIS — M54.50 ACUTE RIGHT-SIDED LOW BACK PAIN WITHOUT SCIATICA: Primary | ICD-10-CM

## 2022-12-21 PROCEDURE — 74011250637 HC RX REV CODE- 250/637: Performed by: STUDENT IN AN ORGANIZED HEALTH CARE EDUCATION/TRAINING PROGRAM

## 2022-12-21 PROCEDURE — 99283 EMERGENCY DEPT VISIT LOW MDM: CPT

## 2022-12-21 PROCEDURE — 74011000250 HC RX REV CODE- 250: Performed by: STUDENT IN AN ORGANIZED HEALTH CARE EDUCATION/TRAINING PROGRAM

## 2022-12-21 RX ORDER — LIDOCAINE 4 G/100G
1 PATCH TOPICAL EVERY 24 HOURS
Status: DISCONTINUED | OUTPATIENT
Start: 2022-12-21 | End: 2022-12-21 | Stop reason: HOSPADM

## 2022-12-21 RX ORDER — IBUPROFEN 600 MG/1
600 TABLET ORAL
Status: COMPLETED | OUTPATIENT
Start: 2022-12-21 | End: 2022-12-21

## 2022-12-21 RX ORDER — ACETAMINOPHEN 325 MG/1
975 TABLET ORAL
Status: COMPLETED | OUTPATIENT
Start: 2022-12-21 | End: 2022-12-21

## 2022-12-21 RX ADMIN — ACETAMINOPHEN 975 MG: 325 TABLET ORAL at 12:26

## 2022-12-21 RX ADMIN — IBUPROFEN 600 MG: 600 TABLET, FILM COATED ORAL at 12:26

## 2022-12-21 NOTE — DISCHARGE INSTRUCTIONS
Please return to the ED for any new or worsening symptoms including numbness or tingling or trouble controlling bowel or bladder. Thank you! Thank you for allowing me to care for you in the emergency department. I sincerely hope that you are satisfied with your visit today. It is my goal to provide you with excellent care. Below you will find a list of your labs and imaging from your visit today if applicable. Should you have any questions regarding these results please do not hesitate to call the emergency department. Please review [employer] for a more detailed result list since the below list may not be comprehensive. Instructions on how to sign up to [employer] should be provided in this packet. Labs -   No results found for this or any previous visit (from the past 12 hour(s)). Radiologic Studies -   XR SPINE LUMB 2 OR 3 V    (Results Pending)   XR SPINE THORAC 3 V    (Results Pending)     CT Results  (Last 48 hours)      None          CXR Results  (Last 48 hours)      None               If you feel that you have not received excellent quality care or timely care, please ask to speak to the nurse manager. Please choose us in the future for your continued health care needs. ------------------------------------------------------------------------------------------------------------  The exam and treatment you received in the Emergency Department were for an urgent problem and are not intended as complete care. It is very important that you follow-up with a doctor, nurse practitioner, or physician assistant in a timely manner to:  (1) confirm your diagnosis and review all imaging and lab results,  (2) re-evaluation of changes in your illness and treatment, and  (3) for ongoing care. If your symptoms become worse or you do not improve as expected and you are unable to reach your usual health care provider, you should return to the Emergency Department. We are available 24 hours a day.      Please take your discharge instructions with you when you go to your follow-up appointment. If a prescription has been provided, please have it filled as soon as possible to prevent a delay in treatment. Read the entire medication instruction sheet provided to you by the pharmacy. If you have any questions or reservations about taking the medication due to side effects or interactions with other medications, please call your primary care physician or contact the ER to speak with the charge nurse. Make an appointment with your family doctor or the physician you were referred to for follow-up of this visit as instructed on your discharge paperwork, as this is a mandatory follow-up. Return to the ER if you are unable to be seen or if you are unable to be seen in a timely manner. If you have any problem arranging the follow-up visit, contact the Emergency Department immediately.

## 2022-12-21 NOTE — ED NOTES
Pt states that getting the xray is taking to long to be done, and is requesting to leave at this time , md made aware
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
## 2023-01-11 ENCOUNTER — HOSPITAL ENCOUNTER (OUTPATIENT)
Dept: GENERAL RADIOLOGY | Age: 23
Discharge: HOME | End: 2023-01-11
Payer: MEDICAID

## 2023-01-11 ENCOUNTER — OFFICE VISIT (OUTPATIENT)
Dept: INTERNAL MEDICINE CLINIC | Age: 23
End: 2023-01-11
Payer: MEDICAID

## 2023-01-11 ENCOUNTER — DOCUMENTATION ONLY (OUTPATIENT)
Dept: INTERNAL MEDICINE CLINIC | Age: 23
End: 2023-01-11

## 2023-01-11 VITALS
RESPIRATION RATE: 18 BRPM | HEART RATE: 89 BPM | WEIGHT: 225.8 LBS | OXYGEN SATURATION: 99 % | DIASTOLIC BLOOD PRESSURE: 70 MMHG | TEMPERATURE: 97.8 F | HEIGHT: 66 IN | SYSTOLIC BLOOD PRESSURE: 114 MMHG | BODY MASS INDEX: 36.29 KG/M2

## 2023-01-11 DIAGNOSIS — M25.559 HIP PAIN: ICD-10-CM

## 2023-01-11 DIAGNOSIS — F32.A DEPRESSION, UNSPECIFIED DEPRESSION TYPE: ICD-10-CM

## 2023-01-11 DIAGNOSIS — M54.50 ACUTE LOW BACK PAIN, UNSPECIFIED BACK PAIN LATERALITY, UNSPECIFIED WHETHER SCIATICA PRESENT: ICD-10-CM
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
## 2023-01-11 PROCEDURE — 99214 OFFICE O/P EST MOD 30 MIN: CPT | Performed by: STUDENT IN AN ORGANIZED HEALTH CARE EDUCATION/TRAINING PROGRAM

## 2023-01-11 PROCEDURE — 73502 X-RAY EXAM HIP UNI 2-3 VIEWS: CPT

## 2023-01-11 RX ORDER — LIDOCAINE 50 MG/G
1 PATCH TOPICAL EVERY 24 HOURS
Qty: 15 EACH | Refills: 0 | Status: SHIPPED | OUTPATIENT
Start: 2023-01-11

## 2023-01-11 RX ORDER — BUPROPION HYDROCHLORIDE 150 MG/1
TABLET ORAL
COMMUNITY
Start: 2022-12-29

## 2023-01-11 RX ORDER — METHOCARBAMOL 750 MG/1
TABLET, FILM COATED ORAL
Qty: 25 TABLET | Refills: 0 | Status: SHIPPED | OUTPATIENT
Start: 2023-01-11

## 2023-01-11 RX ORDER — NAPROXEN 500 MG/1
500 TABLET ORAL 2 TIMES DAILY
COMMUNITY
Start: 2023-01-02

## 2023-01-11 RX ORDER — METHOCARBAMOL 750 MG/1
TABLET, FILM COATED ORAL
COMMUNITY
Start: 2023-01-02 | End: 2023-01-11 | Stop reason: SDUPTHER
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
## 2023-01-18 ENCOUNTER — HOSPITAL ENCOUNTER (OUTPATIENT)
Dept: PHYSICAL THERAPY | Age: 23
Discharge: HOME | End: 2023-01-18
Payer: MEDICAID

## 2023-01-18 PROCEDURE — 97161 PT EVAL LOW COMPLEX 20 MIN: CPT | Performed by: PHYSICAL THERAPIST
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
## 2023-01-19 ENCOUNTER — APPOINTMENT (OUTPATIENT)
Dept: PHYSICAL THERAPY | Age: 23
End: 2023-01-19
Payer: MEDICAID

## 2023-01-26 ENCOUNTER — HOSPITAL ENCOUNTER (OUTPATIENT)
Dept: PHYSICAL THERAPY | Age: 23
Discharge: HOME | End: 2023-01-26
Payer: MEDICAID

## 2023-01-26 PROCEDURE — 97110 THERAPEUTIC EXERCISES: CPT

## 2023-01-26 NOTE — PROGRESS NOTES
PT DAILY TREATMENT NOTE - [facility] 2-15    Patient Name: Marzena Raedr  Date:2023  : 2000  [x]  Patient  Verified  Payor: Kathia Baltazar / Plan: [address] [facility] / Product Type: Managed Care Medicaid /    In time:  145 pm  Out time:  230 pm  Total Treatment Time (min): 45  Total Timed Codes (min): 40  1:1 Treatment Time ([address] only): 3   Visit #:  2    Treatment Area: Other low back pain [M54.59]    SUBJECTIVE  Pain Level (0-10 scale): 5-6  Any medication changes, allergies to medications, adverse drug reactions, diagnosis change, or new procedure performed?: [x] No    [] Yes (see summary sheet for update)  Subjective functional status/changes:   [] No changes reported  Makes an effort to get up from sitting every 30 minutes while at work. Has been exercising and is feeling better. OBJECTIVE    40 min Therapeutic Exercise:  [x] See flow sheet : Includes time spent observing response to exercise and discussing care. Rationale: increase ROM and increase strength to improve the patients ability to sit/stand/walk without pain. With   [x] TE   [] TA   [] neuro   [] other: Patient Education: [x] Review HEP    [] Progressed/Changed HEP based on:   [] positioning   [] body mechanics   [] transfers   [] heat/ice application    [] other:      Other Objective/Functional Measures:      Pain Level (0-10 scale) post treatment: 4    ASSESSMENT/Changes in Function:   Patient did well with all exercises. Required training in posterior pelvic tilt. Symptoms were improved after treatment session. She particularly liked the stretching exercises. Patient will continue to benefit from skilled PT services to modify and progress therapeutic interventions, address ROM deficits, address strength deficits, and analyze and address soft tissue restrictions to attain remaining goals.      [x]  See Plan of Care  []  See progress note/recertification  []  See Discharge Summary         Progress towards goals / Updated goals:  Short Term Goals: To be accomplished in 4 weeks:              1.  Indp with HEP              2.  Move from recumbent position to standing without increasing pain > 2/10. Long Term Goals: To be accomplished in 8 weeks:              1.  Sit x 60 minutes without increase in pain > 2/10  Patient Goal (s): back to baseline 0/10.      PLAN  []  Upgrade activities as tolerated     [x]  Continue plan of care  []  Update interventions per flow sheet       []  Discharge due to:_  []  Other:_      Megha Le, PT 1/26/2023

## 2023-01-27 ENCOUNTER — HOSPITAL ENCOUNTER (OUTPATIENT)
Dept: PHYSICAL THERAPY | Age: 23
Discharge: HOME | End: 2023-01-27
Payer: MEDICAID

## 2023-01-27 PROCEDURE — 97110 THERAPEUTIC EXERCISES: CPT

## 2023-01-27 RX ORDER — BUPROPION HYDROCHLORIDE 150 MG/1
TABLET ORAL
Qty: 90 TABLET | Refills: 3 | Status: SHIPPED | OUTPATIENT
Start: 2023-01-27

## 2023-01-27 NOTE — PROGRESS NOTES
PT DAILY TREATMENT NOTE - [facility] 2-15    Patient Name: Nazario Gilmore  Date:2023  : 2000  [x]  Patient  Verified  Payor: Marysol Thomas / Plan: [facility] / Product Type: Managed Care Medicaid /    In time:  145 pm  Out time:  230 pm  Total Treatment Time (min): 45  Total Timed Codes (min): 40  1:1 Treatment Time ([address] only): 40  Visit #:  3    Treatment Area: Other low back pain [M54.59]    SUBJECTIVE  Pain Level (0-10 scale): 3  Any medication changes, allergies to medications, adverse drug reactions, diagnosis change, or new procedure performed?: [x] No    [] Yes (see summary sheet for update)  Subjective functional status/changes:   [] No changes reported  Pt states that she is feeling much better, and has been working out regularly. OBJECTIVE    40 min Therapeutic Exercise:  [x] See flow sheet : Includes time spent observing response to exercise and discussing care. Rationale: increase ROM and increase strength to improve the patients ability to sit/stand/walk without pain. With   [x] TE   [] TA   [] neuro   [] other: Patient Education: [x] Review HEP    [] Progressed/Changed HEP based on:   [] positioning   [] body mechanics   [] transfers   [] heat/ice application    [] other:      Other Objective/Functional Measures:      Pain Level (0-10 scale) post treatment: 1    ASSESSMENT/Changes in Function:   Pt tolerated session well. Pt continues to be active outside of PT, and requested to discharged due to fast improvement and reduction of symptoms. Supervising PT in agreement of discharge after next session. Patient will continue to benefit from skilled PT services to modify and progress therapeutic interventions, address ROM deficits, address strength deficits, and analyze and address soft tissue restrictions to attain remaining goals.      [x]  See Plan of Care  []  See progress note/recertification  []  See Discharge Summary         Progress towards goals / Updated goals:  Short Term Goals: To be accomplished in 4 weeks:              1.  Indp with HEP              2.  Move from recumbent position to standing without increasing pain > 2/10. Long Term Goals: To be accomplished in 8 weeks:              1.  Sit x 60 minutes without increase in pain > 2/10  Patient Goal (s): back to baseline 0/10.      PLAN  []  Upgrade activities as tolerated     [x]  Continue plan of care  []  Update interventions per flow sheet       []  Discharge due to:_  []  Other:_      Karen Flowers., PTA 1/27/2023

## 2023-01-31 ENCOUNTER — APPOINTMENT (OUTPATIENT)
Dept: PHYSICAL THERAPY | Age: 23
End: 2023-01-31
Payer: MEDICAID

## 2023-02-01 ENCOUNTER — APPOINTMENT (OUTPATIENT)
Dept: PHYSICAL THERAPY | Age: 23
End: 2023-02-01

## 2023-02-03 ENCOUNTER — DOCUMENTATION ONLY (OUTPATIENT)
Dept: INTERNAL MEDICINE CLINIC | Age: 23
End: 2023-02-03

## 2023-02-03 DIAGNOSIS — R31.9 HEMATURIA, UNSPECIFIED TYPE: Primary | ICD-10-CM

## 2023-02-03 NOTE — PROGRESS NOTES
Reviewed [facility] ER records. Patient was noted to have hematuria on urinalysis. And a urine recheck was recommended. X-ray of the lumbar spine with- no fracture offset loss lumbar spine, x-ray of bilateral hips no fracture or dislocation. UA has been ordered, can you let patient know to do drop off the sample at the clinic.

## 2023-02-07 ENCOUNTER — OFFICE VISIT (OUTPATIENT)
Dept: INTERNAL MEDICINE CLINIC | Age: 23
End: 2023-02-07
Payer: MEDICAID

## 2023-02-07 VITALS
TEMPERATURE: 98 F | BODY MASS INDEX: 35.36 KG/M2 | WEIGHT: 220 LBS | DIASTOLIC BLOOD PRESSURE: 58 MMHG | SYSTOLIC BLOOD PRESSURE: 103 MMHG | HEART RATE: 84 BPM | HEIGHT: 66 IN | OXYGEN SATURATION: 98 %

## 2023-02-07 DIAGNOSIS — E66.9 OBESITY (BMI 35.0-39.9 WITHOUT COMORBIDITY): Primary | ICD-10-CM

## 2023-02-07 DIAGNOSIS — F33.1 MODERATE EPISODE OF RECURRENT MAJOR DEPRESSIVE DISORDER (HCC): ICD-10-CM

## 2023-02-07 PROCEDURE — 99214 OFFICE O/P EST MOD 30 MIN: CPT | Performed by: INTERNAL MEDICINE

## 2023-02-07 RX ORDER — PHENTERMINE HYDROCHLORIDE 37.5 MG/1
37.5 TABLET ORAL
Qty: 30 TABLET | Refills: 2 | Status: SHIPPED | OUTPATIENT
Start: 2023-02-07

## 2023-02-07 RX ORDER — PHENTERMINE HYDROCHLORIDE 37.5 MG/1
37.5 TABLET ORAL
COMMUNITY
End: 2023-02-07 | Stop reason: SDUPTHER

## 2023-02-07 NOTE — PROGRESS NOTES
Chief Complaint   Patient presents with    Follow-up    Visit Vitals  BP (!) 103/58 (BP 1 Location: Left upper arm, BP Patient Position: Sitting, BP Cuff Size: Adult)   Pulse 84   Temp 98 °F (36.7 °C) (Temporal)   Ht 5' 6\" (1.676 m)   Wt 220 lb (99.8 kg)   SpO2 98%   BMI 35.51 kg/m²    1. Have you been to the ER, urgent care clinic since your last visit? Hospitalized since your last visit? No    2. Have you seen or consulted any other health care providers outside of the [address] [place] [address] since your last visit? Include any pap smears or colon screening.  No

## 2023-02-07 NOTE — PROGRESS NOTES
Brayan Dumont is a 25 y.o. female and presents with Follow-up (Needs refills)    For depression she says the medication does not work, but she just tried it for a week. She says she feels hopeless, worthless with no pleasure or desire for activities 2 or 3 days of the wee, no suicidal thoughts. She says when she's at home she stays to herself. She does not have any support around. She does say she would like to do talk therapy. For the obesity she took phentermine from September to November 2022, she said she did help her to curb her appetite and lose some weight, she wants to know if she can try another 3-month trial.  She does acknowledge that at this moment she is not making the best decisions in terms of what she eats and she needs to get better, she says that she is eating fast food for the most part most of the days. She has stopped snacking on chips which she was doing frequently before. She says she currently may be eats once a day and of course when she eats she is very hungry so she eats more than she should in a meal.    Review of Systems  Review of Systems   Constitutional:  Negative for chills, fatigue, fever and unexpected weight change. HENT:  Negative for congestion, ear pain, sneezing and sore throat. Eyes:  Negative for pain and discharge. Respiratory:  Negative for cough, shortness of breath and wheezing. Cardiovascular:  Negative for chest pain, palpitations and leg swelling. Gastrointestinal:  Negative for abdominal pain, blood in stool, constipation and diarrhea. Endocrine: Negative for polydipsia and polyuria. Genitourinary:  Negative for difficulty urinating, dysuria, frequency, hematuria and urgency. Musculoskeletal:  Negative for arthralgias, back pain and joint swelling. Skin:  Negative for rash. Allergic/Immunologic: Negative for environmental allergies and food allergies.    Neurological:  Negative for dizziness, speech difficulty, weakness, light-headedness, numbness and headaches. Hematological:  Negative for adenopathy. Psychiatric/Behavioral:  Negative for behavioral problems (Depression), sleep disturbance and suicidal ideas. Past Medical History:   Diagnosis Date    Chlamydial infection 07/15/2020    Depression     Genital herpes simplex 07/15/2020    Herpes     LGSIL on Pap smear of cervix 08/22/2021    Morbid obesity ([facility] [address] 07/15/2020    Trauma     Vaginal irritation 2022    Vaginal irritation 05/02/2022    Vaginal pain 07/15/2020    *Chronic     History reviewed. No pertinent surgical history. Social History     Socioeconomic History    Marital status: SINGLE   Tobacco Use    Smoking status: Some Days     Types: Cigars    Smokeless tobacco: Never    Tobacco comments:     pt smokes black and milds   Vaping Use    Vaping Use: Never used   Substance and Sexual Activity    Alcohol use: Yes    Drug use: Never    Sexual activity: Yes     Partners: Male     Birth control/protection: None     Social Determinants of Health     Financial Resource Strain: Low Risk     Difficulty of Paying Living Expenses: Not hard at all   Food Insecurity: No Food Insecurity    Worried About Running Out of Food in the Last Year: Never true    Ran Out of Food in the Last Year: Never true   Physical Activity: Inactive    Days of Exercise per Week: 0 days    Minutes of Exercise per Session: 0 min     Family History   Family history unknown: Yes     Current Outpatient Medications   Medication Sig Dispense Refill    phentermine (ADIPEX-P) 37.5 mg tablet Take 1 Tablet by mouth every morning. Max Daily Amount: 37.5 mg. 30 Tablet 2    buPROPion XL (WELLBUTRIN XL) 150 mg tablet TAKE 1 TABLET BY MOUTH EVERY MORNING FOR MAJOR DEPRESSION 90 Tablet 3    naproxen (NAPROSYN) 500 mg tablet Take 500 mg by mouth two (2) times a day. lidocaine (LIDODERM) 5 % 1 Patch by TransDERmal route every twenty-four (24) hours.  Apply patch to the affected area for 12 hours a day and remove for 12 hours a day. 15 Each 0    methocarbamoL (ROBAXIN) 750 mg tablet TAKE 1 TABLET BY MOUTH FOUR TIMES DAILY FOR MUSCLE PAIN 25 Tablet 0     No Known Allergies    Objective:  Visit Vitals  BP (!) 103/58 (BP 1 Location: Left upper arm, BP Patient Position: Sitting, BP Cuff Size: Adult)   Pulse 84   Temp 98 °F (36.7 °C) (Temporal)   Ht 5' 6\" (1.676 m)   Wt 220 lb (99.8 kg)   SpO2 98%   BMI 35.51 kg/m²     Physical Exam:   Physical Exam  Constitutional:       General: She is not in acute distress. Appearance: Normal appearance. HENT:      Head: Normocephalic and atraumatic. Mouth/Throat:      Mouth: Mucous membranes are moist.   Eyes:      Extraocular Movements: Extraocular movements intact. Conjunctiva/sclera: Conjunctivae normal.      Pupils: Pupils are equal, round, and reactive to light. Cardiovascular:      Rate and Rhythm: Normal rate and regular rhythm. Pulses: Normal pulses. Heart sounds: Normal heart sounds. Pulmonary:      Effort: Pulmonary effort is normal.      Breath sounds: Normal breath sounds. Abdominal:      General: Abdomen is flat. Bowel sounds are normal. There is no distension. Palpations: Abdomen is soft. There is no mass. Tenderness: There is no abdominal tenderness. Musculoskeletal:         General: No swelling or deformity. Cervical back: Normal range of motion and neck supple. Right lower leg: No edema. Left lower leg: No edema. Lymphadenopathy:      Cervical: No cervical adenopathy. Skin:     General: Skin is warm and dry. Capillary Refill: Capillary refill takes less than 2 seconds. Coloration: Skin is not jaundiced or pale. Findings: No erythema or rash. Neurological:      General: No focal deficit present. Mental Status: She is alert and oriented to person, place, and time. Psychiatric:         Mood and Affect: Mood normal.         Behavior: Behavior normal.         Thought Content:  Thought content normal. Judgment: Judgment normal.        Results for orders placed or performed in visit on 12/14/22   NUAB VAGINITIS PLUS (VG+) WITH CANDIDA (SIX SPECIES)   Result Value Ref Range    Atopobium vaginae High - 2 (A) Score    BVAB 2 Moderate - 1 Score    Megasphaera 1 Low - 0 Score    C. albicans, KACI Positive (A) Negative    C. glabrata, KACI Negative Negative    C PARAPSILOSIS/TROPICALIS Negative Negative    Candida lusitaniae Negative Negative    Candida krusei Negative Negative    T. vaginalis, KACI Negative Negative    C. trachomatis, KACI Negative Negative    N. gonorrhoeae, KACI Negative Negative       Assessment/Plan:    Counseled her about consistency with antidepressant, explained her that she want to find any significant changes only with 1 week of taking it she needs to be consistent with at least 6 weeks to have changes, and she needs to talk to me before stopping her medications on her own, I counseled her about the risk of stopping them. She is willing to do counseling, gave her resources for this. Okay to do another trial of phentermine for 3 months, counseled on proper diet habits and physical activity      ICD-10-CM ICD-9-CM    1. Obesity (BMI 35.0-39.9 without comorbidity)  E66.9 278.00 phentermine (ADIPEX-P) 37.5 mg tablet      2. Moderate episode of recurrent major depressive disorder ([facility]  F33.1 296.32 REFERRAL TO BEHAVIORAL HEALTH        Orders Placed This Encounter    REFERRAL TO BEHAVIORAL HEALTH     Referral Priority:   Routine     Referral Type:   Behavioral Health     Referral Reason:   Specialty Services Required     Number of Visits Requested:   1    DISCONTD: phentermine (ADIPEX-P) 37.5 mg tablet     Sig: Take 37.5 mg by mouth every morning. phentermine (ADIPEX-P) 37.5 mg tablet     Sig: Take 1 Tablet by mouth every morning.  Max Daily Amount: 37.5 mg.     Dispense:  30 Tablet     Refill:  2     lose weight, increase physical activity, follow low fat diet, follow low salt diet  There are no Patient Instructions on file for this visit. Follow-up and Dispositions    Return in about 3 months (around 5/7/2023).

## 2023-02-21 ENCOUNTER — TELEPHONE (OUTPATIENT)
Dept: OBGYN CLINIC | Age: 23
End: 2023-02-21

## 2023-02-21 NOTE — TELEPHONE ENCOUNTER
2/21/2023 @1:25pm-Pt left message on VM regarding an appt. At 3:26pm, returned phone call to patient and L/M on VM to have patient contact the office at [phone]. Jena Zhong ww

## 2023-02-23 ENCOUNTER — TELEPHONE (OUTPATIENT)
Dept: OBGYN CLINIC | Age: 23
End: 2023-02-23

## 2023-03-03 ENCOUNTER — TELEPHONE (OUTPATIENT)
Dept: OBGYN CLINIC | Age: 23
End: 2023-03-03

## 2023-03-03 NOTE — TELEPHONE ENCOUNTER
3/3/2023 @9:58am-Pt L/M on VM regarding an appt. At 10:58am, returned phone call to patient and L/M on VM to have patient contact the office at [phone]. ww

## 2023-03-03 NOTE — TELEPHONE ENCOUNTER
3/3/2023 @11:05am-Pt L/M on VM regarding an appt. At 1:16pm, returned phone call to patient and L/M on VM to have patient R/T phone call to [phone]. ww

## 2023-03-14 ENCOUNTER — TELEPHONE (OUTPATIENT)
Dept: OBGYN CLINIC | Age: 23
End: 2023-03-14

## 2023-03-14 NOTE — TELEPHONE ENCOUNTER
Patient called stating she needs to reschedule her appointment with Dr Nadine Bush. Appointment rescheduled to 03/24/23.

## 2023-03-24 ENCOUNTER — OFFICE VISIT (OUTPATIENT)
Dept: OBGYN CLINIC | Age: 23
End: 2023-03-24

## 2023-03-24 VITALS
WEIGHT: 219.25 LBS | SYSTOLIC BLOOD PRESSURE: 128 MMHG | HEIGHT: 66 IN | DIASTOLIC BLOOD PRESSURE: 72 MMHG | BODY MASS INDEX: 35.24 KG/M2

## 2023-03-24 DIAGNOSIS — Z00.00 ANNUAL VISIT FOR GENERAL ADULT MEDICAL EXAMINATION WITHOUT ABNORMAL FINDINGS: Primary | ICD-10-CM

## 2023-03-24 DIAGNOSIS — Z11.3 SCREENING FOR VENEREAL DISEASE: ICD-10-CM

## 2023-03-24 DIAGNOSIS — Z11.51 SCREENING FOR HUMAN PAPILLOMAVIRUS: ICD-10-CM

## 2023-03-24 DIAGNOSIS — N76.0 ACUTE VAGINITIS: ICD-10-CM

## 2023-03-24 DIAGNOSIS — Z01.419 PAP SMEAR, AS PART OF ROUTINE GYNECOLOGICAL EXAMINATION: ICD-10-CM

## 2023-03-24 DIAGNOSIS — N94.6 DYSMENORRHEA: ICD-10-CM

## 2023-03-24 LAB
HCG URINE, QL. (POC): NEGATIVE
PAP SMEAR, EXTERNAL: NEGATIVE
VALID INTERNAL CONTROL?: YES

## 2023-03-24 RX ORDER — NORGESTIMATE AND ETHINYL ESTRADIOL 0.25-0.035
1 KIT ORAL DAILY
Qty: 1 DOSE PACK | Refills: 11 | Status: SHIPPED | OUTPATIENT
Start: 2023-03-24 | End: 2023-04-23

## 2023-03-24 NOTE — PROGRESS NOTES
Dionisio Gilliam is a 25 y.o. female who presents today for the following:  Chief Complaint   Patient presents with    Annual Exam     Pt presents for annual exam with complaints of recurrent yeast and bacterial infections. [place]     Vaginal Discharge        No Known Allergies    Current Outpatient Medications   Medication Sig    norgestimate-ethinyl estradioL (Sprintec, 28,) 0.25-35 mg-mcg tab Take 1 Tablet by mouth daily for 30 days. phentermine (ADIPEX-P) 37.5 mg tablet Take 1 Tablet by mouth every morning. Max Daily Amount: 37.5 mg.    buPROPion XL (WELLBUTRIN XL) 150 mg tablet TAKE 1 TABLET BY MOUTH EVERY MORNING FOR MAJOR DEPRESSION    naproxen (NAPROSYN) 500 mg tablet Take 500 mg by mouth two (2) times a day. lidocaine (LIDODERM) 5 % 1 Patch by TransDERmal route every twenty-four (24) hours. Apply patch to the affected area for 12 hours a day and remove for 12 hours a day. methocarbamoL (ROBAXIN) 750 mg tablet TAKE 1 TABLET BY MOUTH FOUR TIMES DAILY FOR MUSCLE PAIN     No current facility-administered medications for this visit. Past Medical History:   Diagnosis Date    Chlamydial infection 07/15/2020    Depression     Genital herpes simplex 07/15/2020    Herpes     LGSIL on Pap smear of cervix 08/22/2021    Morbid obesity ([address] 07/15/2020    Trauma     Vaginal irritation 2022    Vaginal irritation 05/02/2022    Vaginal pain 07/15/2020    *Chronic       History reviewed. No pertinent surgical history.     Family History   Family history unknown: Yes       Social History     Socioeconomic History    Marital status: SINGLE     Spouse name: Not on file    Number of children: Not on file    Years of education: Not on file    Highest education level: Not on file   Occupational History    Not on file   Tobacco Use    Smoking status: Former     Types: Cigars    Smokeless tobacco: Never    Tobacco comments:     pt smokes black and milds   Vaping Use    Vaping Use: Never used   Substance and Sexual Activity    Alcohol use: Not Currently    Drug use: Never    Sexual activity: Yes     Partners: Male     Birth control/protection: None   Other Topics Concern    Not on file   Social History Narrative    Not on file     Social Determinants of Health     Financial Resource Strain: Low Risk     Difficulty of Paying Living Expenses: Not hard at all   Food Insecurity: No Food Insecurity    Worried About Running Out of Food in the Last Year: Never true    Ran Out of Food in the Last Year: Never true   Transportation Needs: Not on file   Physical Activity: Inactive    Days of Exercise per Week: 0 days    Minutes of Exercise per Session: 0 min   Stress: Not on file   Social Connections: Not on file   Intimate Partner Violence: Not At Risk    Fear of Current or Ex-Partner: No    Emotionally Abused: No    Physically Abused: No    Sexually Abused: No   Housing Stability: Not on file         ROS   Review of Systems   Constitutional: Negative. HENT: Negative. Eyes: Negative. Respiratory: Negative. Cardiovascular: Negative. Gastrointestinal: Negative. Genitourinary: Negative. Musculoskeletal: Negative. Skin: Negative. Neurological: Negative. Endo/Heme/Allergies: Negative. Psychiatric/Behavioral: Negative. /72   Ht 5' 6\" (1.676 m)   Wt 219 lb 4 oz (99.5 kg)   BMI 35.39 kg/m²    OBGyn Exam   Constitutional  Appearance: well-nourished, well developed, alert, in no acute distress    HENT  Head and Face: appears normal    Neck  Inspection/Palpation: normal appearance, no masses or tenderness  Lymph Nodes: no lymphadenopathy present  Thyroid: gland size normal, nontender, no nodules or masses present on palpation    Breasts  Symmetric, no palpable masses, no tenderness, no skin changes, no nipple abnormality, no nipple discharge, no lymphadenopathy. Chest  Respiratory Effort: breathing labored  Auscultation: normal breath sounds    Cardiovascular  Heart:   Auscultation: regular rate and rhythm without murmur    Gastrointestinal  Abdominal Examination: abdomen non-tender to palpation, normal bowel sounds, no masses present  Liver and spleen: no hepatomegaly present, spleen not palpable  Hernias: no hernias identified    Genitourinary  External Genitalia: normal appearance for age, no discharge present, no tenderness present, no inflammatory lesions present, no masses present, no atrophy present  Vagina: normal vaginal vault without central or paravaginal defects, no discharge present, no inflammatory lesions present, no masses present  Bladder: non-tender to palpation  Urethra: appears normal  Cervix: normal   Uterus: normal size, shape and consistency  Adnexa: no adnexal tenderness present, no adnexal masses present  Perineum: perineum within normal limits, no evidence of trauma, no rashes or skin lesions present  Anus: anus within normal limits, no hemorrhoids present  Inguinal Lymph Nodes: no lymphadenopathy present    Skin  General Inspection: no rash, no lesions identified    Neurologic/Psychiatric  Mental Status:  Orientation: grossly oriented to person, place and time  Mood and Affect: mood normal, affect appropriate    Results for orders placed or performed in visit on 03/24/23   AMB POC URINE PREGNANCY TEST, VISUAL COLOR COMPARISON   Result Value Ref Range    VALID INTERNAL CONTROL POC Yes     HCG urine, Ql. (POC) Negative Negative        Orders Placed This Encounter    NUSWAB VAGINITIS PLUS (VG+) WITH CANDIDA (SIX SPECIES)    HIV 1/2 AG/AB, 4TH GENERATION,W RFLX CONFIRM    RPR    HEPATITIS PANEL, ACUTE    AMB POC URINE PREGNANCY TEST, VISUAL COLOR COMPARISON    norgestimate-ethinyl estradioL (Sprintec, 28,) 0.25-35 mg-mcg tab     Sig: Take 1 Tablet by mouth daily for 30 days. Dispense:  1 Dose Pack     Refill:  11    PAP IG, CT-NG-TV, RFX APTIMA HPV ASCUS ([id number])     Order Specific Question:   Pap Source? Answer:   Cervical     Order Specific Question:   Total Hysterectomy? Answer:   No     Order Specific Question:   Supracervical Hysterectomy? Answer:   No     Order Specific Question:   Post Menopausal?     Answer:   No     Order Specific Question:   Hormone Therapy? Answer:   No     Order Specific Question:   IUD? Answer:   No     Order Specific Question:   Abnormal Bleeding? Answer:   No     Order Specific Question:   Pregnant     Answer:   No     Order Specific Question:   Post Partum? Answer:   No     23 YO ANNUAL  ON PHENTERMINE, DOES NOT TAKE ANY CONTRACEPTION    1. Annual visit for general adult medical examination without abnormal findings    - AMB POC URINE PREGNANCY TEST, VISUAL COLOR COMPARISON    2. Dysmenorrhea    - norgestimate-ethinyl estradioL (Sprintec, 28,) 0.25-35 mg-mcg tab; Take 1 Tablet by mouth daily for 30 days. Dispense: 1 Dose Pack; Refill: 11    3. Pap smear, as part of routine gynecological examination    - PAP IG, CT-NG-TV, RFX APTIMA HPV ASCUS ([id number])    4. Screening for human papillomavirus    - PAP IG, CT-NG-TV, RFX APTIMA HPV ASCUS ([id number])    5. Screening for venereal disease    - PAP IG, CT-NG-TV, RFX APTIMA HPV ASCUS ([id number])  - HIV 1/2 AG/AB, 4TH GENERATION,W RFLX CONFIRM  - RPR  - HEPATITIS PANEL, ACUTE    6. Acute vaginitis    - [facility] VAGINITIS PLUS (VG+) WITH CANDIDA (SIX SPECIES)        Follow-up and Dispositions    Return in about 1 year (around 3/24/2024).

## 2023-03-25 LAB
HAV IGM SERPL QL IA: NEGATIVE
HBV CORE IGM SERPL QL IA: NEGATIVE
HBV SURFACE AG SERPL QL IA: NEGATIVE
HCV AB SERPL QL IA: NORMAL
HCV IGG SERPL QL IA: NON REACTIVE
HIV 1+2 AB+HIV1 P24 AG SERPL QL IA: NON REACTIVE
RPR SER QL: NON REACTIVE

## 2023-03-30 LAB
C TRACH RRNA CVX QL NAA+PROBE: NEGATIVE
CYTOLOGIST CVX/VAG CYTO: NORMAL
CYTOLOGY CVX/VAG DOC CYTO: NORMAL
CYTOLOGY CVX/VAG DOC THIN PREP: NORMAL
DX ICD CODE: NORMAL
LABCORP, 190119: NORMAL
Lab: NORMAL
N GONORRHOEA RRNA CVX QL NAA+PROBE: NEGATIVE
OTHER STN SPEC: NORMAL
STAT OF ADQ CVX/VAG CYTO-IMP: NORMAL
T VAGINALIS RRNA SPEC QL NAA+PROBE: NEGATIVE

## 2023-05-19 RX ORDER — BUPROPION HYDROCHLORIDE 150 MG/1
TABLET ORAL
COMMUNITY
Start: 2023-01-27

## 2023-05-19 RX ORDER — PHENTERMINE HYDROCHLORIDE 37.5 MG/1
37.5 TABLET ORAL
COMMUNITY
Start: 2023-02-07

## 2023-05-19 RX ORDER — METHOCARBAMOL 750 MG/1
TABLET, FILM COATED ORAL
COMMUNITY
Start: 2023-01-11

## 2023-05-19 RX ORDER — LIDOCAINE 50 MG/G
1 PATCH TOPICAL EVERY 24 HOURS
COMMUNITY
Start: 2023-01-11

## 2023-05-19 RX ORDER — NAPROXEN 500 MG/1
500 TABLET ORAL 2 TIMES DAILY
COMMUNITY
Start: 2023-01-02

## 2023-06-02 RX ORDER — NORGESTIMATE AND ETHINYL ESTRADIOL 0.25-0.035
1 KIT ORAL DAILY
COMMUNITY
Start: 2023-05-04

## 2023-06-05 ENCOUNTER — OFFICE VISIT (OUTPATIENT)
Age: 23
End: 2023-06-05
Payer: MEDICAID

## 2023-06-05 VITALS
WEIGHT: 219.19 LBS | DIASTOLIC BLOOD PRESSURE: 73 MMHG | TEMPERATURE: 97.3 F | HEIGHT: 66 IN | HEART RATE: 92 BPM | RESPIRATION RATE: 16 BRPM | BODY MASS INDEX: 35.23 KG/M2 | OXYGEN SATURATION: 97 % | SYSTOLIC BLOOD PRESSURE: 129 MMHG

## 2023-06-05 DIAGNOSIS — N76.1 CHRONIC VAGINITIS: Primary | ICD-10-CM

## 2023-06-05 PROCEDURE — 99213 OFFICE O/P EST LOW 20 MIN: CPT | Performed by: OBSTETRICS & GYNECOLOGY

## 2023-06-05 RX ORDER — METRONIDAZOLE 500 MG/1
500 TABLET ORAL 2 TIMES DAILY
Qty: 14 TABLET | Refills: 0 | Status: SHIPPED | OUTPATIENT
Start: 2023-06-05 | End: 2023-06-12

## 2023-06-05 RX ORDER — FLUCONAZOLE 150 MG/1
150 TABLET ORAL ONCE
Qty: 1 TABLET | Refills: 0 | Status: SHIPPED | OUTPATIENT
Start: 2023-06-05 | End: 2023-06-05

## 2023-06-05 NOTE — PROGRESS NOTES
/73 (Site: Left Upper Arm, Position: Sitting, Cuff Size: Medium Adult)   Pulse 92   Temp 97.3 °F (36.3 °C)   Resp 16   Ht 5' 6\" (1.676 m)   Wt 219 lb 3 oz (99.4 kg)   LMP 05/07/2023 (Approximate)   SpO2 97%   BMI 35.38 kg/m²     Chief Complaint   Patient presents with    Follow-up     C/o recurrent BV, has a vaginal discharge with odor
normal appearance for age, no discharge present, no tenderness present, no inflammatory lesions present, no masses present, no atrophy present  Vagina: normal vaginal vault without central or paravaginal defects, no discharge present, no inflammatory lesions present, no masses present  Bladder: non-tender to palpation  Urethra: appears normal  Cervix: normal   Uterus: normal size, shape and consistency  Adnexa: no adnexal tenderness present, no adnexal masses present  Perineum: perineum within normal limits, no evidence of trauma, no rashes or skin lesions present  Anus: anus within normal limits, no hemorrhoids present  Inguinal Lymph Nodes: no lymphadenopathy present    Skin  General Inspection: no rash, no lesions identified    Neurologic/Psychiatric  Mental Status:  Orientation: grossly oriented to person, place and time  Mood and Affect: mood normal, affect appropriate    [unfilled]     No orders of the defined types were placed in this encounter. 24 YO WITH CHRONIC RECURRENT BV AND YEAST    1. Chronic vaginitis    - metroNIDAZOLE (FLAGYL) 500 MG tablet; Take 1 tablet by mouth 2 times daily for 7 days  Dispense: 14 tablet; Refill: 0  - fluconazole (DIFLUCAN) 150 MG tablet; Take 1 tablet by mouth once for 1 dose  Dispense: 1 tablet; Refill: 0  - BORIC ACID X 4 WEEKS      Return in about 4 weeks (around 7/3/2023).

## 2023-06-08 DIAGNOSIS — B96.89 BV (BACTERIAL VAGINOSIS): Primary | ICD-10-CM

## 2023-06-08 DIAGNOSIS — N76.0 BV (BACTERIAL VAGINOSIS): Primary | ICD-10-CM

## 2023-06-08 LAB
A VAGINAE DNA VAG QL NAA+PROBE: ABNORMAL SCORE
BVAB2 DNA VAG QL NAA+PROBE: ABNORMAL SCORE
C ALBICANS DNA VAG QL NAA+PROBE: NEGATIVE
C GLABRATA DNA VAG QL NAA+PROBE: NEGATIVE
C TRACH DNA VAG QL NAA+PROBE: NEGATIVE
MEGA1 DNA VAG QL NAA+PROBE: ABNORMAL SCORE
N GONORRHOEA DNA VAG QL NAA+PROBE: NEGATIVE
T VAGINALIS DNA VAG QL NAA+PROBE: NEGATIVE

## 2023-06-08 RX ORDER — METRONIDAZOLE 500 MG/1
500 TABLET ORAL 2 TIMES DAILY
Qty: 14 TABLET | Refills: 0 | Status: SHIPPED | OUTPATIENT
Start: 2023-06-08 | End: 2023-06-15

## 2023-08-27 DIAGNOSIS — E66.09 CLASS 2 OBESITY DUE TO EXCESS CALORIES WITHOUT SERIOUS COMORBIDITY WITH BODY MASS INDEX (BMI) OF 36.0 TO 36.9 IN ADULT: Primary | ICD-10-CM

## 2023-08-29 ENCOUNTER — HOSPITAL ENCOUNTER (EMERGENCY)
Facility: HOSPITAL | Age: 23
Discharge: HOME | End: 2023-08-29
Attending: STUDENT IN AN ORGANIZED HEALTH CARE EDUCATION/TRAINING PROGRAM
Payer: MEDICAID

## 2023-08-29 VITALS
TEMPERATURE: 98.5 F | OXYGEN SATURATION: 100 % | HEIGHT: 67 IN | WEIGHT: 230 LBS | RESPIRATION RATE: 19 BRPM | BODY MASS INDEX: 36.1 KG/M2 | HEART RATE: 97 BPM | DIASTOLIC BLOOD PRESSURE: 78 MMHG | SYSTOLIC BLOOD PRESSURE: 127 MMHG

## 2023-08-29 DIAGNOSIS — B34.9 VIRAL SYNDROME: Primary | ICD-10-CM

## 2023-08-29 PROCEDURE — 99282 EMERGENCY DEPT VISIT SF MDM: CPT

## 2023-08-29 ASSESSMENT — PAIN DESCRIPTION - LOCATION: LOCATION: HEAD

## 2023-08-29 ASSESSMENT — PAIN - FUNCTIONAL ASSESSMENT: PAIN_FUNCTIONAL_ASSESSMENT: 0-10

## 2023-08-29 ASSESSMENT — PAIN SCALES - GENERAL: PAINLEVEL_OUTOF10: 5

## 2023-08-30 ENCOUNTER — OFFICE VISIT (OUTPATIENT)
Facility: CLINIC | Age: 23
End: 2023-08-30
Payer: MEDICAID

## 2023-08-30 VITALS
HEART RATE: 98 BPM | RESPIRATION RATE: 18 BRPM | DIASTOLIC BLOOD PRESSURE: 79 MMHG | HEIGHT: 67 IN | SYSTOLIC BLOOD PRESSURE: 115 MMHG | BODY MASS INDEX: 35.44 KG/M2 | WEIGHT: 225.8 LBS | OXYGEN SATURATION: 96 % | TEMPERATURE: 97.3 F

## 2023-08-30 DIAGNOSIS — G43.709 CHRONIC MIGRAINE WITHOUT AURA WITHOUT STATUS MIGRAINOSUS, NOT INTRACTABLE: ICD-10-CM

## 2023-08-30 DIAGNOSIS — F33.1 MODERATE RECURRENT MAJOR DEPRESSION (HCC): Primary | ICD-10-CM

## 2023-08-30 DIAGNOSIS — E66.9 OBESITY (BMI 35.0-39.9 WITHOUT COMORBIDITY): ICD-10-CM

## 2023-08-30 PROCEDURE — 99214 OFFICE O/P EST MOD 30 MIN: CPT | Performed by: INTERNAL MEDICINE

## 2023-08-30 RX ORDER — BUTALBITAL, ACETAMINOPHEN AND CAFFEINE 50; 325; 40 MG/1; MG/1; MG/1
1 TABLET ORAL EVERY 4 HOURS PRN
COMMUNITY
Start: 2023-08-27

## 2023-08-30 RX ORDER — FAMOTIDINE 20 MG/1
20 TABLET, FILM COATED ORAL 2 TIMES DAILY
COMMUNITY
Start: 2023-08-21

## 2023-08-30 RX ORDER — PHENTERMINE HYDROCHLORIDE 37.5 MG/1
TABLET ORAL
Qty: 30 TABLET | Refills: 0 | Status: SHIPPED | OUTPATIENT
Start: 2023-08-30 | End: 2023-09-29

## 2023-08-30 RX ORDER — SUMATRIPTAN 50 MG/1
50 TABLET, FILM COATED ORAL
Qty: 27 TABLET | Refills: 1 | Status: SHIPPED | OUTPATIENT
Start: 2023-08-30 | End: 2023-08-30

## 2023-08-30 SDOH — ECONOMIC STABILITY: INCOME INSECURITY: HOW HARD IS IT FOR YOU TO PAY FOR THE VERY BASICS LIKE FOOD, HOUSING, MEDICAL CARE, AND HEATING?: NOT HARD AT ALL

## 2023-08-30 SDOH — ECONOMIC STABILITY: HOUSING INSECURITY
IN THE LAST 12 MONTHS, WAS THERE A TIME WHEN YOU DID NOT HAVE A STEADY PLACE TO SLEEP OR SLEPT IN A SHELTER (INCLUDING NOW)?: NO

## 2023-08-30 SDOH — ECONOMIC STABILITY: FOOD INSECURITY: WITHIN THE PAST 12 MONTHS, YOU WORRIED THAT YOUR FOOD WOULD RUN OUT BEFORE YOU GOT MONEY TO BUY MORE.: NEVER TRUE

## 2023-08-30 SDOH — ECONOMIC STABILITY: FOOD INSECURITY: WITHIN THE PAST 12 MONTHS, THE FOOD YOU BOUGHT JUST DIDN'T LAST AND YOU DIDN'T HAVE MONEY TO GET MORE.: NEVER TRUE

## 2023-08-30 ASSESSMENT — ENCOUNTER SYMPTOMS
GASTROINTESTINAL NEGATIVE: 1
RESPIRATORY NEGATIVE: 1

## 2023-08-30 NOTE — PROGRESS NOTES
Philippe Woods is a 21 y.o. female and presents with Follow-up, Depression, and Weight Management    Nabeel Rhoades is here for follow up in Obesity and depression. She's on phentermiene for weight loss, she restarted taking it 2 months ago, but she cory not lost weight, gained 5 lbs since last visit. She stopped taking the phentermine in June, she says she has not been consistent with it. Depression and anxiety: she says she tried to take the bupropion for about 2 months but she stopped it because she felt that it was helping the anxiety but it was making her more depressed, she was feeling a lot worse when she was taking it. At this moment she says she is not doing well at all, she feels hopeless, worthless every day, she is so depressed to the point that she has been avoiding going to work, she does not want to talk to anyone, she isolates herself at home, not doing any leisure activity, not finding pleasure on anything she does, she says she \"gets too much to her had\", thinks about an event that happened for several days for example. She denies thoughts of self-harm or thoughts of being better dead than alive. She says for several months she has hbeen having right parietal an occipital headaches sharp in nature, with phopptophobia, phopnophobia, sometimes she can't hold her head up from so much headache, she has gone to the Er for this. Im a week she had the headaches 3 or 4 times a week. Denies snoring, no daytime sleepiness    Review of Systems  Review of Systems   Constitutional: Negative. HENT: Negative. Respiratory: Negative. Cardiovascular: Negative. Gastrointestinal: Negative. Endocrine: Negative. Genitourinary: Negative. Musculoskeletal: Negative. Skin: Negative. Neurological: Negative. Psychiatric/Behavioral: Negative. All other systems reviewed and are negative.        Past Medical History:   Diagnosis Date    Chlamydial infection 07/15/2020    Depression

## 2023-09-19 ENCOUNTER — HOSPITAL ENCOUNTER (EMERGENCY)
Facility: HOSPITAL | Age: 23
Discharge: HOME | End: 2023-09-19
Attending: STUDENT IN AN ORGANIZED HEALTH CARE EDUCATION/TRAINING PROGRAM
Payer: MEDICAID

## 2023-09-19 VITALS
HEIGHT: 66 IN | TEMPERATURE: 98.5 F | HEART RATE: 70 BPM | RESPIRATION RATE: 16 BRPM | OXYGEN SATURATION: 98 % | WEIGHT: 225 LBS | DIASTOLIC BLOOD PRESSURE: 74 MMHG | SYSTOLIC BLOOD PRESSURE: 104 MMHG | BODY MASS INDEX: 36.16 KG/M2

## 2023-09-19 DIAGNOSIS — Z20.822 ENCOUNTER FOR LABORATORY TESTING FOR COVID-19 VIRUS: Primary | ICD-10-CM

## 2023-09-19 PROCEDURE — 87635 SARS-COV-2 COVID-19 AMP PRB: CPT

## 2023-09-19 PROCEDURE — 99283 EMERGENCY DEPT VISIT LOW MDM: CPT

## 2023-09-19 ASSESSMENT — PAIN - FUNCTIONAL ASSESSMENT: PAIN_FUNCTIONAL_ASSESSMENT: NONE - DENIES PAIN

## 2023-09-19 NOTE — ED TRIAGE NOTES
Wants to have covid test done, aware its only send out testing. Has been around people recently dx.  No c/o any sxs

## 2023-09-19 NOTE — ED PROVIDER NOTES
SUMAtriptan (IMITREX) 50 MG tablet Take 1 tablet by mouth once as needed for Migraine  Qty: 27 tablet, Refills: 1    Associated Diagnoses: Chronic migraine without aura without status migrainosus, not intractable            2. Montez Keller MD  [address] [place] [address]  [phone]    Call today      3. Return to ED if worse    4. Medication List        ASK your doctor about these medications      butalbital-acetaminophen-caffeine -40 MG per tablet  Commonly known as: FIORICET, ESGIC     famotidine 20 MG tablet  Commonly known as: PEPCID     phentermine 37.5 MG tablet  Commonly known as: ADIPEX-P  TAKE 1 TABLET BY MOUTH EVERY MORNING. MAX DAILY AMOUNT: 37.5 MG     sertraline 50 MG tablet  Commonly known as: ZOLOFT  Take 0.5 tablets by mouth daily for 30 days, THEN 1 tablet daily. Start taking on: August 30, 2023     SUMAtriptan 50 MG tablet  Commonly known as: IMITREX  Take 1 tablet by mouth once as needed for Migraine              Procedures, Critical Care, & Clinical Tools   Performed by: Donna Douglass MD  Procedures     Not Applicable. Results, Consults, Medications     Consults:  None   Labs:  No results found for this or any previous visit (from the past 12 hour(s)). Radiologic Studies:  No orders to display     Medications ordered:  Medications - No data to display    Documentation Comments   - I am the first and primary provider for this patient and am the primary provider of record. - Initial assessment performed. The patients presenting problems have been discussed, and the staff are in agreement with the care plan formulated and outlined with them. I have encouraged them to ask questions as they arise throughout their visit. - Available medical records, nursing notes, old EKGs, and EMS run sheets (if patient was EMS transported) were reviewed    Please note that this dictation was completed with [employer], the [employer] voice recognition software.

## 2023-09-19 NOTE — DISCHARGE INSTRUCTIONS
Thank you! Thank you for allowing me to care for you in the emergency department. I sincerely hope that you are satisfied with your visit today. It is my goal to provide you with excellent care. Below you will find a list of your labs and imaging from your visit today if applicable. Should you have any questions regarding these results please do not hesitate to call the emergency department. Please review [employer] for a more detailed result list since the below list may not be comprehensive. Instructions on how to sign up to Skyf[employer] should be provided in this packet. Labs -   No results found for this or any previous visit (from the past 12 hour(s)). Radiologic Studies -   No orders to display     [unfilled]  @[facility]       If you feel that you have not received excellent quality care or timely care, please ask to speak to the nurse manager. Please choose us in the future for your continued health care needs. ------------------------------------------------------------------------------------------------------------  The exam and treatment you received in the Emergency Department were for an urgent problem and are not intended as complete care. It is very important that you follow-up with a doctor, nurse practitioner, or physician assistant in a timely manner to:  (1) confirm your diagnosis and review all imaging and lab results,  (2) re-evaluation of changes in your illness and treatment, and  (3) for ongoing care. If your symptoms become worse or you do not improve as expected and you are unable to reach your usual health care provider, you should return to the Emergency Department. We are available 24 hours a day. Please take your discharge instructions with you when you go to your follow-up appointment. If a prescription has been provided, please have it filled as soon as possible to prevent a delay in treatment. Read the entire medication instruction sheet provided to you by the pharmacy.  If

## 2023-09-20 ENCOUNTER — OFFICE VISIT (OUTPATIENT)
Age: 23
End: 2023-09-20
Payer: MEDICAID

## 2023-09-20 VITALS
DIASTOLIC BLOOD PRESSURE: 70 MMHG | WEIGHT: 228.5 LBS | OXYGEN SATURATION: 100 % | HEIGHT: 66 IN | TEMPERATURE: 96.9 F | RESPIRATION RATE: 18 BRPM | SYSTOLIC BLOOD PRESSURE: 136 MMHG | BODY MASS INDEX: 36.72 KG/M2 | HEART RATE: 86 BPM

## 2023-09-20 DIAGNOSIS — N76.1 CHRONIC VAGINITIS: Primary | ICD-10-CM

## 2023-09-20 DIAGNOSIS — N76.0 BV (BACTERIAL VAGINOSIS): ICD-10-CM

## 2023-09-20 DIAGNOSIS — B96.89 BV (BACTERIAL VAGINOSIS): ICD-10-CM

## 2023-09-20 LAB
SARS-COV-2 RNA RESP QL NAA+PROBE: NOT DETECTED
SOURCE: NORMAL

## 2023-09-20 PROCEDURE — 99213 OFFICE O/P EST LOW 20 MIN: CPT | Performed by: OBSTETRICS & GYNECOLOGY

## 2023-09-20 RX ORDER — METRONIDAZOLE 7.5 MG/G
GEL VAGINAL
Qty: 13 EACH | Refills: 0 | Status: SHIPPED | OUTPATIENT
Start: 2023-09-20 | End: 2023-09-27

## 2023-09-20 SDOH — ECONOMIC STABILITY: INCOME INSECURITY: HOW HARD IS IT FOR YOU TO PAY FOR THE VERY BASICS LIKE FOOD, HOUSING, MEDICAL CARE, AND HEATING?: NOT HARD AT ALL

## 2023-09-20 SDOH — ECONOMIC STABILITY: FOOD INSECURITY: WITHIN THE PAST 12 MONTHS, YOU WORRIED THAT YOUR FOOD WOULD RUN OUT BEFORE YOU GOT MONEY TO BUY MORE.: NEVER TRUE

## 2023-09-20 SDOH — ECONOMIC STABILITY: FOOD INSECURITY: WITHIN THE PAST 12 MONTHS, THE FOOD YOU BOUGHT JUST DIDN'T LAST AND YOU DIDN'T HAVE MONEY TO GET MORE.: NEVER TRUE

## 2023-09-20 ASSESSMENT — PATIENT HEALTH QUESTIONNAIRE - PHQ9
SUM OF ALL RESPONSES TO PHQ QUESTIONS 1-9: 0
1. LITTLE INTEREST OR PLEASURE IN DOING THINGS: 0
2. FEELING DOWN, DEPRESSED OR HOPELESS: 0
SUM OF ALL RESPONSES TO PHQ9 QUESTIONS 1 & 2: 0

## 2023-09-20 NOTE — PROGRESS NOTES
Sue Gomez is a 21 y.o. female who presents today for the following:  Chief Complaint   Patient presents with    Follow-up     F/u for recurrent BV, reports no symptoms        No Known Allergies    Current Outpatient Medications   Medication Sig Dispense Refill    metroNIDAZOLE (METROGEL VAGINAL) 0.75 % vaginal gel 1 PV QHS X 5 DAYS , THEN 2/WEEK X 4 WEEKS, TOTAL OF 13  INSERTIONS 13 each 0    phentermine (ADIPEX-P) 37.5 MG tablet TAKE 1 TABLET BY MOUTH EVERY MORNING. MAX DAILY AMOUNT: 37.5 MG (Patient not taking: Reported on 9/20/2023) 30 tablet 0    butalbital-acetaminophen-caffeine (FIORICET, ESGIC) -40 MG per tablet Take 1 tablet by mouth every 4 hours as needed (Patient not taking: Reported on 9/20/2023)      famotidine (PEPCID) 20 MG tablet Take 1 tablet by mouth 2 times daily (Patient not taking: Reported on 9/20/2023)      sertraline (ZOLOFT) 50 MG tablet Take 0.5 tablets by mouth daily for 30 days, THEN 1 tablet daily. (Patient not taking: Reported on 9/20/2023) 75 tablet 0    SUMAtriptan (IMITREX) 50 MG tablet Take 1 tablet by mouth once as needed for Migraine 27 tablet 1     No current facility-administered medications for this visit. Past Medical History:   Diagnosis Date    Chlamydial infection 07/15/2020    Depression     Genital herpes simplex 07/15/2020    Herpes     LGSIL on Pap smear of cervix 08/22/2021    Morbid obesity ([address]) 07/15/2020    Trauma     Vaginal irritation 05/02/2022    Vaginal irritation 2022    Vaginal pain 07/15/2020    *Chronic       No past surgical history on file.     Family History   Family history unknown: Yes       Social History     Socioeconomic History    Marital status: Single     Spouse name: Not on file    Number of children: Not on file    Years of education: Not on file    Highest education level: Not on file   Occupational History    Not on file   Tobacco Use    Smoking status: Every Day     Types: Cigars    Smokeless tobacco: Never   Vaping Use

## 2023-09-23 LAB
A VAGINAE DNA VAG QL NAA+PROBE: NORMAL SCORE
BVAB2 DNA VAG QL NAA+PROBE: NORMAL SCORE
C ALBICANS DNA VAG QL NAA+PROBE: NEGATIVE
C GLABRATA DNA VAG QL NAA+PROBE: NEGATIVE
C TRACH DNA VAG QL NAA+PROBE: NEGATIVE
MEGA1 DNA VAG QL NAA+PROBE: NORMAL SCORE
N GONORRHOEA DNA VAG QL NAA+PROBE: NEGATIVE
T VAGINALIS DNA VAG QL NAA+PROBE: NEGATIVE

## 2023-11-25 DIAGNOSIS — F33.1 MODERATE RECURRENT MAJOR DEPRESSION (HCC): ICD-10-CM

## 2023-12-11 ENCOUNTER — TELEPHONE (OUTPATIENT)
Facility: CLINIC | Age: 23
End: 2023-12-11

## 2023-12-11 DIAGNOSIS — E66.09 CLASS 2 OBESITY DUE TO EXCESS CALORIES WITHOUT SERIOUS COMORBIDITY WITH BODY MASS INDEX (BMI) OF 36.0 TO 36.9 IN ADULT: ICD-10-CM

## 2023-12-11 DIAGNOSIS — F33.1 MODERATE RECURRENT MAJOR DEPRESSION (HCC): ICD-10-CM

## 2023-12-11 RX ORDER — PHENTERMINE HYDROCHLORIDE 37.5 MG/1
TABLET ORAL
Qty: 30 TABLET | Refills: 0 | Status: CANCELLED | OUTPATIENT
Start: 2023-12-11

## 2023-12-11 RX ORDER — ACYCLOVIR 800 MG/1
800 TABLET ORAL 2 TIMES DAILY
Qty: 14 TABLET | Refills: 0 | Status: SHIPPED | OUTPATIENT
Start: 2023-12-11 | End: 2023-12-18

## 2024-01-08 DIAGNOSIS — O36.80X0 ENCOUNTER TO DETERMINE FETAL VIABILITY OF PREGNANCY, SINGLE OR UNSPECIFIED FETUS: Primary | ICD-10-CM

## 2024-01-10 ENCOUNTER — INITIAL PRENATAL (OUTPATIENT)
Age: 24
End: 2024-01-10

## 2024-01-10 VITALS
BODY MASS INDEX: 38.09 KG/M2 | WEIGHT: 237 LBS | DIASTOLIC BLOOD PRESSURE: 87 MMHG | SYSTOLIC BLOOD PRESSURE: 143 MMHG | HEIGHT: 66 IN

## 2024-01-10 DIAGNOSIS — Z3A.08 8 WEEKS GESTATION OF PREGNANCY: Primary | ICD-10-CM

## 2024-01-10 DIAGNOSIS — Z34.91 NORMAL PREGNANCY IN FIRST TRIMESTER: ICD-10-CM

## 2024-01-10 PROBLEM — Z34.90 NORMAL PREGNANCY: Status: ACTIVE | Noted: 2024-01-10

## 2024-01-10 PROBLEM — Z34.90 PREGNANCY: Status: ACTIVE | Noted: 2024-01-10

## 2024-01-10 NOTE — PROGRESS NOTES
Ketones:NEG // GLU:NEG //  Blo:NEG //  Pro:NEG //  Nit:NEG // Leuk: NEG   Pt presents with complaints of nausea daily//Ana

## 2024-02-06 ENCOUNTER — ROUTINE PRENATAL (OUTPATIENT)
Age: 24
End: 2024-02-06

## 2024-02-06 VITALS
WEIGHT: 231.38 LBS | BODY MASS INDEX: 37.18 KG/M2 | SYSTOLIC BLOOD PRESSURE: 135 MMHG | DIASTOLIC BLOOD PRESSURE: 95 MMHG | HEIGHT: 66 IN

## 2024-02-06 DIAGNOSIS — O09.91 HIGH-RISK PREGNANCY IN FIRST TRIMESTER: ICD-10-CM

## 2024-02-06 DIAGNOSIS — O99.210 PREGNANCY COMPLICATED BY OBESITY, UNSPECIFIED TRIMESTER: ICD-10-CM

## 2024-02-06 DIAGNOSIS — Z3A.12 12 WEEKS GESTATION OF PREGNANCY: ICD-10-CM

## 2024-02-06 DIAGNOSIS — N76.0 ACUTE VAGINITIS: Primary | ICD-10-CM

## 2024-02-06 PROCEDURE — 0502F SUBSEQUENT PRENATAL CARE: CPT | Performed by: OBSTETRICS & GYNECOLOGY

## 2024-02-06 NOTE — PROGRESS NOTES
PT UNABLE TO LEAVE URINE SAMPLE  Pt presents with complaints of a yeast infection x 2 weeks//AFP NEXT//Ana

## 2024-02-06 NOTE — PROGRESS NOTES
C/O YEAST, STATES THAT SHE TRIED TERAZOL 7 AND IS STILL IRRITATED  MICONAZOLE CALLED INLIANE COLLECTED

## 2024-02-15 ENCOUNTER — TELEPHONE (OUTPATIENT)
Age: 24
End: 2024-02-15

## 2024-02-15 DIAGNOSIS — Z3A.12 12 WEEKS GESTATION OF PREGNANCY: Primary | ICD-10-CM

## 2024-02-15 NOTE — TELEPHONE ENCOUNTER
Returned patients phone call she advised that someone had already contacted her and will be placing order she is unsure of who she spoke with.

## 2024-02-21 ENCOUNTER — TELEPHONE (OUTPATIENT)
Age: 24
End: 2024-02-21

## 2024-02-22 LAB
CFDNA.FET/CFDNA.TOTAL SFR FETUS: NORMAL %
CITATION REF LAB TEST: NORMAL
FET 13+18+21+X+Y ANEUP PLAS.CFDNA: NEGATIVE
FET CHR 21 TS PLAS.CFDNA QL: NEGATIVE
FET MS X RISK WBC.DNA+CFDNA QL: NOT DETECTED
FET SEX PLAS.CFDNA DOSAGE CFDNA: NORMAL
FET TS 13 RISK PLAS.CFDNA QL: NEGATIVE
FET TS 18 RISK WBC.DNA+CFDNA QL: NEGATIVE
FET X + Y ANEUP RISK PLAS.CFDNA SEQ-IMP: NOT DETECTED
GA EST FROM CONCEPTION DATE: NORMAL D
GESTATIONAL AGE > OR = 9 WEEKS: YES
LAB DIRECTOR NAME PROVIDER: NORMAL
LAB DIRECTOR NAME PROVIDER: NORMAL
LABORATORY COMMENT REPORT: NORMAL
LIMITATIONS OF THE TEST: NORMAL
Lab: NORMAL
NEGATIVE PREDICTIVE VALUE: NORMAL
PERFORMANCE CHARACTERISTICS: NORMAL
POSITIVE PREDICTIVE VALUE: NORMAL
REF LAB TEST METHOD: NORMAL
TEST PERFORMANCE INFO SPEC: NORMAL

## 2024-02-23 ENCOUNTER — TELEPHONE (OUTPATIENT)
Age: 24
End: 2024-02-23

## 2024-02-23 NOTE — TELEPHONE ENCOUNTER
Received a call from the patient stating she needs to have dental care.  OB dental note entered into the patient's chart.

## 2024-02-28 LAB
CITATION REF LAB TEST: ABNORMAL
CLINICAL INFO: ABNORMAL
ETHNIC BACKGROUND STATED: ABNORMAL
GENE STUDIED ID: ABNORMAL
INDICATION: ABNORMAL
LAB DIRECTOR NAME PROVIDER: ABNORMAL
Lab: ABNORMAL
MOL DX INTERP BLD/T QL: ABNORMAL
RECOMMENDATION PATIENT DOC-IMP: ABNORMAL
REF LAB TEST METHOD: ABNORMAL
SERVICE CMNT-IMP: ABNORMAL
SPECIMEN SOURCE: ABNORMAL

## 2024-03-06 ENCOUNTER — ROUTINE PRENATAL (OUTPATIENT)
Age: 24
End: 2024-03-06

## 2024-03-06 VITALS
DIASTOLIC BLOOD PRESSURE: 85 MMHG | BODY MASS INDEX: 35.49 KG/M2 | SYSTOLIC BLOOD PRESSURE: 122 MMHG | HEIGHT: 67 IN | WEIGHT: 226.13 LBS

## 2024-03-06 DIAGNOSIS — Z3A.16 16 WEEKS GESTATION OF PREGNANCY: ICD-10-CM

## 2024-03-06 DIAGNOSIS — R11.0 NAUSEA: ICD-10-CM

## 2024-03-06 DIAGNOSIS — G43.719 INTRACTABLE CHRONIC MIGRAINE WITHOUT AURA AND WITHOUT STATUS MIGRAINOSUS: ICD-10-CM

## 2024-03-06 DIAGNOSIS — O09.92 HIGH-RISK PREGNANCY IN SECOND TRIMESTER: ICD-10-CM

## 2024-03-06 DIAGNOSIS — Z14.8 CARRIER OF SPINAL MUSCULAR ATROPHY: Primary | ICD-10-CM

## 2024-03-06 PROCEDURE — 0502F SUBSEQUENT PRENATAL CARE: CPT | Performed by: OBSTETRICS & GYNECOLOGY

## 2024-03-06 RX ORDER — BUTALBITAL, ACETAMINOPHEN AND CAFFEINE 50; 325; 40 MG/1; MG/1; MG/1
2 TABLET ORAL EVERY 6 HOURS PRN
Qty: 30 TABLET | Refills: 3 | Status: SHIPPED | OUTPATIENT
Start: 2024-03-06

## 2024-03-06 RX ORDER — PROMETHAZINE HYDROCHLORIDE 25 MG/1
25 TABLET ORAL 4 TIMES DAILY PRN
Qty: 20 TABLET | Refills: 3 | Status: SHIPPED | OUTPATIENT
Start: 2024-03-06 | End: 2024-03-13

## 2024-03-06 NOTE — PROGRESS NOTES
PT UNABLE TO LEAVE URINE SAMPLE  Pt presents with complaints of nausea and headaches daily//[id number] WNL, SMN1 CARRIER//Ana

## 2024-03-06 NOTE — PROGRESS NOTES
HIGH RISK  MUSCLE DYSTROPHY POS  WILL REFER TO MFM FOR COUNSELING  MIGRAINE, WILL RX FIORICET  NAUSEA, RX FOR PHENERGAN SENT

## 2024-03-15 RX ORDER — ACYCLOVIR 800 MG/1
800 TABLET ORAL 2 TIMES DAILY
Qty: 14 TABLET | Refills: 0 | Status: SHIPPED | OUTPATIENT
Start: 2024-03-15 | End: 2024-03-22

## 2024-03-21 ENCOUNTER — TELEPHONE (OUTPATIENT)
Age: 24
End: 2024-03-21

## 2024-03-21 NOTE — TELEPHONE ENCOUNTER
3/21/2024 @9:46am-Called [phone], no answer and unable to leave a message due to MB is full. This call is regarding R/S appt location from [facility] to  due to change in Dr. Fernandez's schedule.ww

## 2024-04-03 ENCOUNTER — ROUTINE PRENATAL (OUTPATIENT)
Age: 24
End: 2024-04-03

## 2024-04-03 VITALS
BODY MASS INDEX: 36.8 KG/M2 | OXYGEN SATURATION: 97 % | SYSTOLIC BLOOD PRESSURE: 121 MMHG | HEIGHT: 66 IN | DIASTOLIC BLOOD PRESSURE: 76 MMHG | WEIGHT: 229 LBS | HEART RATE: 103 BPM | RESPIRATION RATE: 16 BRPM

## 2024-04-03 DIAGNOSIS — Z3A.20 20 WEEKS GESTATION OF PREGNANCY: ICD-10-CM

## 2024-04-03 DIAGNOSIS — Z34.92 NORMAL PREGNANCY IN SECOND TRIMESTER: Primary | ICD-10-CM

## 2024-04-03 PROCEDURE — 0502F SUBSEQUENT PRENATAL CARE: CPT | Performed by: OBSTETRICS & GYNECOLOGY

## 2024-04-16 RX ORDER — ACYCLOVIR 800 MG/1
800 TABLET ORAL 2 TIMES DAILY
Qty: 14 TABLET | Refills: 0 | Status: SHIPPED | OUTPATIENT
Start: 2024-04-16 | End: 2024-04-23

## 2024-04-30 ENCOUNTER — ROUTINE PRENATAL (OUTPATIENT)
Age: 24
End: 2024-04-30

## 2024-04-30 VITALS
WEIGHT: 230 LBS | DIASTOLIC BLOOD PRESSURE: 74 MMHG | SYSTOLIC BLOOD PRESSURE: 104 MMHG | BODY MASS INDEX: 36.96 KG/M2 | HEIGHT: 66 IN

## 2024-04-30 DIAGNOSIS — Z34.92 NORMAL PREGNANCY IN SECOND TRIMESTER: ICD-10-CM

## 2024-04-30 DIAGNOSIS — Z3A.24 24 WEEKS GESTATION OF PREGNANCY: Primary | ICD-10-CM

## 2024-04-30 DIAGNOSIS — A60.03 HERPES SIMPLEX CERVICITIS: ICD-10-CM

## 2024-04-30 PROCEDURE — 0502F SUBSEQUENT PRENATAL CARE: CPT | Performed by: OBSTETRICS & GYNECOLOGY

## 2024-04-30 NOTE — PROGRESS NOTES
DOING WELL  GTT NEXT  
Ketones:NEG // GLU:NEG //  Blo:NEG //  Pro:NEG //  Nit:NEG // Leuk: NEG   Pt presents with no new ob complaints//GLUCOLA NEXT//Ana   
General
Attending MD Tresa Taylor:  I personally have seen and examined this patient.  Resident note reviewed and agree on plan of care and except where noted.  See HPI, PE, and MDM for details.

## 2024-05-28 ENCOUNTER — ROUTINE PRENATAL (OUTPATIENT)
Age: 24
End: 2024-05-28

## 2024-05-28 VITALS
SYSTOLIC BLOOD PRESSURE: 115 MMHG | WEIGHT: 227.56 LBS | HEIGHT: 66 IN | DIASTOLIC BLOOD PRESSURE: 71 MMHG | BODY MASS INDEX: 36.57 KG/M2

## 2024-05-28 DIAGNOSIS — Z13.1 DIABETES MELLITUS SCREENING: Primary | ICD-10-CM

## 2024-05-28 DIAGNOSIS — O99.210 PREGNANCY COMPLICATED BY OBESITY, UNSPECIFIED TRIMESTER: ICD-10-CM

## 2024-05-28 DIAGNOSIS — O09.92 HIGH-RISK PREGNANCY IN SECOND TRIMESTER: ICD-10-CM

## 2024-05-28 DIAGNOSIS — Z3A.28 28 WEEKS GESTATION OF PREGNANCY: ICD-10-CM

## 2024-05-28 PROCEDURE — 0502F SUBSEQUENT PRENATAL CARE: CPT | Performed by: OBSTETRICS & GYNECOLOGY

## 2024-05-28 NOTE — PROGRESS NOTES
[employer] verification #: [id number] (Medicaid for Foster Care)   HX OF MUSCLE DYSTROPHY  FOLLOWED BY MFM  GTT TODAY  DOING WELL

## 2024-05-28 NOTE — PROGRESS NOTES
Ketones:NEG // GLU:NEG //  Blo:NEG //  Pro:NEG //  Nit:NEG // Leuk: NEG   Pt presents with no new ob complaints//GLUCOLA TODAY//Ana

## 2024-05-29 LAB — GLUCOSE 1H P 50 G GLC PO SERPL-MCNC: 84 MG/DL (ref 70–139)

## 2024-06-11 ENCOUNTER — ROUTINE PRENATAL (OUTPATIENT)
Age: 24
End: 2024-06-11

## 2024-06-11 VITALS
DIASTOLIC BLOOD PRESSURE: 64 MMHG | WEIGHT: 231.38 LBS | BODY MASS INDEX: 37.18 KG/M2 | SYSTOLIC BLOOD PRESSURE: 107 MMHG | HEIGHT: 66 IN

## 2024-06-11 DIAGNOSIS — A60.03 HERPES SIMPLEX CERVICITIS: ICD-10-CM

## 2024-06-11 DIAGNOSIS — Z3A.30 30 WEEKS GESTATION OF PREGNANCY: ICD-10-CM

## 2024-06-11 DIAGNOSIS — E66.9 OBESITY (BMI 35.0-39.9 WITHOUT COMORBIDITY): ICD-10-CM

## 2024-06-11 DIAGNOSIS — Z34.92 NORMAL PREGNANCY IN SECOND TRIMESTER: Primary | ICD-10-CM

## 2024-06-11 PROCEDURE — 0502F SUBSEQUENT PRENATAL CARE: CPT | Performed by: OBSTETRICS & GYNECOLOGY

## 2024-06-11 RX ORDER — ACYCLOVIR 800 MG/1
800 TABLET ORAL 2 TIMES DAILY
Qty: 14 TABLET | Refills: 0 | Status: SHIPPED | OUTPATIENT
Start: 2024-06-11 | End: 2024-06-18

## 2024-06-11 NOTE — PROGRESS NOTES
Ketones:NEG // GLU:NEG //  Blo:NEG //  Pro:NEG //  Nit:NEG // Leuk: NEG   Pt presents with no new ob complaints//GLUCOLA WNL//Ana

## 2024-06-18 ENCOUNTER — TELEPHONE (OUTPATIENT)
Age: 24
End: 2024-06-18

## 2024-06-18 NOTE — TELEPHONE ENCOUNTER
6/18/2024-Pt called to inquiry about what she would have to do to get FMLA paperwork filled out. Explained to patient, FMLA paperwork can be dropped off at either of our locations- or [place], $20 charge, and it can take 7 to 14 business day to have it completed.ww

## 2024-07-02 ENCOUNTER — ROUTINE PRENATAL (OUTPATIENT)
Age: 24
End: 2024-07-02

## 2024-07-02 VITALS
SYSTOLIC BLOOD PRESSURE: 107 MMHG | DIASTOLIC BLOOD PRESSURE: 61 MMHG | WEIGHT: 233.5 LBS | BODY MASS INDEX: 37.52 KG/M2 | HEIGHT: 66 IN

## 2024-07-02 DIAGNOSIS — A60.03 HERPES SIMPLEX CERVICITIS: Primary | ICD-10-CM

## 2024-07-02 DIAGNOSIS — Z34.93 NORMAL PREGNANCY IN THIRD TRIMESTER: ICD-10-CM

## 2024-07-02 DIAGNOSIS — Z3A.33 33 WEEKS GESTATION OF PREGNANCY: ICD-10-CM

## 2024-07-02 PROCEDURE — 0502F SUBSEQUENT PRENATAL CARE: CPT | Performed by: OBSTETRICS & GYNECOLOGY

## 2024-07-02 RX ORDER — ACYCLOVIR 400 MG/1
400 TABLET ORAL 2 TIMES DAILY
Qty: 60 TABLET | Refills: 2 | Status: SHIPPED | OUTPATIENT
Start: 2024-07-02 | End: 2024-08-01

## 2024-07-02 NOTE — PROGRESS NOTES
[facility]  Consult [facility]           Reason for Consult:  tapan  Requesting Physician:  Dr. Silvia Campbell    Chief Complaint:  Shortness of breath  History Obtained From:  patient, electronic medical record    History of Present Ilness:    28y.o. year old male admitted with respiratory failure. Patient with no previous medical history other than morbid obesity. Around 600 pounds. He first tested positive for Covid in January. It is unclear to me how much the symptoms went away or if they persisted. Per the chart his initial symptoms were fever nausea vomiting diarrhea. Symptoms worsened. Came in with respiratory distress. Had to be intubated. He had a CT scan with contrast.  No signs of pulmonary embolism. He has diffuse bilateral groundglass infiltrates. Currently on 60% FiO2. He has been febrile. Considering transfer downtown due to our limits in terms of diagnostic testing etc. due to his morbid obesity. He is making some urine. About 650 cc over the last day. He is getting about 100 cc an hour for a little bit more than that in terms of his drips tube feeds etc.  He is on Levophed. Past Medical History:    History reviewed. No pertinent past medical history. Past Surgical History:    History reviewed. No pertinent surgical history. Home Medications:    No current facility-administered medications on file prior to encounter. Current Outpatient Medications on File Prior to Encounter   Medication Sig Dispense Refill    brompheniramine-pseudoephedrine-DM 2-30-10 MG/5ML syrup Take 5 mLs by mouth 4 times daily as needed for Congestion or Cough 180 mL 0    albuterol sulfate HFA (VENTOLIN HFA) 108 (90 Base) MCG/ACT inhaler Inhale 2 puffs into the lungs 4 times daily as needed for Wheezing 1 Inhaler 0       Allergies:  Patient has no known allergies.     Social History:    Social History     Socioeconomic History    Marital status: Single     Spouse name: Not on file    Number of Ketones:NEG // GLU:NEG //  Blo:NEG //  Pro:NEG //  Nit:NEG // Leuk: NEG   Pt presents with no new ob complaints//GBS NEXT//Ana    unable to assess  Musculoskeletal:   digits, nails normal    Data/  CBC:   Lab Results   Component Value Date    WBC 6.6 04/06/2021    RBC 5.89 04/06/2021    HGB 16.9 04/06/2021    HCT 47.9 04/06/2021    MCV 81.3 04/06/2021    MCH 26.3 04/06/2021    MCHC 32.4 04/06/2021    RDW 16.2 04/06/2021     04/06/2021     BMP:    Lab Results   Component Value Date     04/06/2021    K 4.6 04/06/2021    CL 97 04/06/2021    CO2 20 04/06/2021    BUN 40 04/06/2021    LABALBU 3.1 04/06/2021    CREATININE 1.8 04/06/2021    CREATININE 1.72 04/06/2021    CALCIUM 7.6 04/06/2021    GFRAA 53 04/06/2021    LABGLOM 44 04/06/2021    GLUCOSE 130 04/06/2021     Xr Chest (2 Vw)    Result Date: 3/12/2021  EXAM: CHEST, 2 VIEWS CLINICAL HISTORY:  wheezing, cough  COMPARISONS:  NONE AVAILABLE Findings: The cardiomediastinal silhouette is within normal limits. There are no infiltrates, consolidations or effusions. Bones of the thorax appear intact. No radiographic evidence of acute intrathoracic process. Cta Chest W Wo  (pe Study)    Result Date: 4/5/2021  CT PULMONARY ANGIOGRAM WITH INTRAVENOUS CONTRAST MEDIUM. REASON FOR EXAMINATION:  SHORTNESS OF BREATH TECHNIQUE: Helical CTA was performed through the chest utilizing 100 mL of Isovue-300 intravenous contrast.  Images were obtained with bolus tracking in order to opacify the pulmonary arteries. Thick section coronal MIP 3D reconstructions were performed  on a separate workstation. COMPARISON:none FINDINGS:  Study limited secondary to suboptimal bolus administration. Pulmonary arteries: No intraluminal filling defects, main, right, and left pulmonary arteries. Bilateral interlobar, segmental, and subsegmental branches cannot be assessed secondary to suboptimal bolus administration. Transverse diameter, main pulmonary  artery 4.0 cm at level pulmonary chill bifurcation. Thoracic aorta: Normal in course and caliber.  Transverse diameter, thoracic aorta, 3.2 cm, at level of pulmonary arterial bifurcation. Cardiac Size: Normal. Pericardial effusion: None. Right lung: No nodules, masses, pleural effusion, pneumothorax. Diffuse, peripheral, groundglass opacities, right lung. Left lung: No nodules, masses, pleural effusion, pneumothorax. Diffuse, peripheral, groundglass opacities, left lung. Lymph nodes: No hilar, mediastinal, or axillary lymph node enlargement. Upper abdomen:Limited imaging upper abdomen shows reflux of contrast medium into inferior vena cava and hepatic veins. Musculoskeletal:No osteoblastic, and no osteolytic lesions. Limited study as discussed. Marked diffuse bilateral groundglass opacities. These findings may be seen in patients with covid 19 pneumonia. However, other infectious and inflammatory etiologies may cause similar radiographic appearance. No CT evidence pulmonary embolism main, right, and left pulmonary arteries. Bilateral interlobar, segmental, and subsegmental branches cannot be assessed secondary to suboptimal bolus administration. Enlarged main pulmonary artery as discussed, suggestive of pulmonary arterial hypertension. Reflux of contrast medium into hepatic veins, suggestive of right heart dysfunction/failure. All CT scans at this facility use dose modulation, iterative reconstruction, and/or weight based dosing when appropriate to reduce radiation dose to as low as reasonably achievable. Xr Chest Portable    Result Date: 4/5/2021  EXAMINATION: XR CHEST PORTABLE CLINICAL HISTORY: ENDOTRACHEAL AND NASOGASTRIC TUBE PLACEMENT. COMPARISONS: CT CHEST, APRIL 5, 2021, CHEST RADIOGRAPH, MARCH 12, 2021 FINDINGS: Endotracheal tube tip 4 cm superior to enedina. Nasogastric tube courses beneath diaphragm and into stomach. Osseous structures intact. Cardiopericardial silhouette normal. Diffuse bilateral patchy airspace disease identified. BILATERAL ATELECTASIS/PNEUMONIA VERSUS EDEMA. Assessment/  77-year-old man with COVID-19 pneumonia.   Respiratory failure on the vent. Risk factor of morbid obesity. Has nonoliguric acute kidney injury. Multifactorial ATN. He had IV contrast.  Hypotensive. Febrile. COVID-19. Considering transfer downtown. Treated with Actemra, remdesivir, Decadron. He is not on IV fluids but his drips and tube feeds at up to about 120-130 cc an hour currently    Plan/  1- no indication for renal replacement therapy at the current time. Potassium/bicarbonate levels are okay  2-continue drips as is without adding IV fluids. 3-send urine for electrolytes, creatinine  4-Daily labs. Okay for transfer downtown    Thank you for the consultation. Please do not hesitate to call with questions.     Darliss Mcburney

## 2024-07-16 ENCOUNTER — ROUTINE PRENATAL (OUTPATIENT)
Age: 24
End: 2024-07-16

## 2024-07-16 VITALS
BODY MASS INDEX: 37.35 KG/M2 | WEIGHT: 232.38 LBS | DIASTOLIC BLOOD PRESSURE: 70 MMHG | SYSTOLIC BLOOD PRESSURE: 132 MMHG | HEIGHT: 66 IN

## 2024-07-16 DIAGNOSIS — Z3A.35 35 WEEKS GESTATION OF PREGNANCY: ICD-10-CM

## 2024-07-16 DIAGNOSIS — Z36.85 ANTENATAL SCREENING FOR STREPTOCOCCUS B: Primary | ICD-10-CM

## 2024-07-16 PROCEDURE — 0502F SUBSEQUENT PRENATAL CARE: CPT | Performed by: OBSTETRICS & GYNECOLOGY

## 2024-07-18 LAB — GP B STREP DNA SPEC QL NAA+PROBE: POSITIVE

## 2024-07-23 ENCOUNTER — ROUTINE PRENATAL (OUTPATIENT)
Age: 24
End: 2024-07-23

## 2024-07-23 VITALS
DIASTOLIC BLOOD PRESSURE: 64 MMHG | BODY MASS INDEX: 38.29 KG/M2 | WEIGHT: 238.25 LBS | HEIGHT: 66 IN | SYSTOLIC BLOOD PRESSURE: 109 MMHG

## 2024-07-23 DIAGNOSIS — A60.03 HERPES SIMPLEX CERVICITIS: ICD-10-CM

## 2024-07-23 DIAGNOSIS — Z3A.36 36 WEEKS GESTATION OF PREGNANCY: Primary | ICD-10-CM

## 2024-07-23 DIAGNOSIS — Z34.93 NORMAL PREGNANCY IN THIRD TRIMESTER: ICD-10-CM

## 2024-07-23 PROCEDURE — 0502F SUBSEQUENT PRENATAL CARE: CPT | Performed by: OBSTETRICS & GYNECOLOGY

## 2024-07-23 NOTE — PROGRESS NOTES
Ketones:NEG // GLU:NEG //  Blo:NEG //  Pro:NEG //  Nit:NEG // Leuk: NEG   Pt presents with no new ob complaints//GBS POS//[place]

## 2024-07-29 ENCOUNTER — ROUTINE PRENATAL (OUTPATIENT)
Age: 24
End: 2024-07-29

## 2024-07-29 VITALS
BODY MASS INDEX: 38.13 KG/M2 | SYSTOLIC BLOOD PRESSURE: 128 MMHG | HEIGHT: 66 IN | DIASTOLIC BLOOD PRESSURE: 66 MMHG | WEIGHT: 237.25 LBS

## 2024-07-29 DIAGNOSIS — Z3A.36 36 WEEKS GESTATION OF PREGNANCY: Primary | ICD-10-CM

## 2024-07-29 PROCEDURE — 0502F SUBSEQUENT PRENATAL CARE: CPT | Performed by: OBSTETRICS & GYNECOLOGY

## 2024-07-29 NOTE — PROGRESS NOTES
Ketones:NEG // GLU:NEG //  Blo:NEG //  Pro:NEG //  Nit:NEG // Leuk: NEG   Pt presents with complaints of itching all over body daily//GBS POS//Ana

## 2024-08-09 ENCOUNTER — ROUTINE PRENATAL (OUTPATIENT)
Age: 24
End: 2024-08-09

## 2024-08-09 VITALS
DIASTOLIC BLOOD PRESSURE: 94 MMHG | WEIGHT: 241.31 LBS | BODY MASS INDEX: 38.78 KG/M2 | HEIGHT: 66 IN | SYSTOLIC BLOOD PRESSURE: 142 MMHG

## 2024-08-09 DIAGNOSIS — Z3A.38 38 WEEKS GESTATION OF PREGNANCY: ICD-10-CM

## 2024-08-09 DIAGNOSIS — A60.03 HERPES SIMPLEX CERVICITIS: ICD-10-CM

## 2024-08-09 DIAGNOSIS — I10 HYPERTENSION, UNSPECIFIED TYPE: Primary | ICD-10-CM

## 2024-08-09 NOTE — PROGRESS NOTES
DOING WELL  FOLLOWED BY MFM  GBS POS  CX 2 CM  ON HSV SUPPRESSION   ELEVATED BP TODAY,ASYMPTOMATIC,  WILL COLLECT PIH LABS  IOL NEXT WEEK

## 2024-08-10 LAB
ALBUMIN SERPL-MCNC: 3.2 G/DL (ref 4–5)
ALP SERPL-CCNC: 141 IU/L (ref 44–121)
ALT SERPL-CCNC: 11 IU/L (ref 0–32)
AST SERPL-CCNC: 15 IU/L (ref 0–40)
BASOPHILS # BLD AUTO: 0 X10E3/UL (ref 0–0.2)
BASOPHILS NFR BLD AUTO: 1 %
BILIRUB SERPL-MCNC: 0.2 MG/DL (ref 0–1.2)
BUN SERPL-MCNC: 5 MG/DL (ref 6–20)
BUN/CREAT SERPL: 10 (ref 9–23)
CALCIUM SERPL-MCNC: 8.7 MG/DL (ref 8.7–10.2)
CHLORIDE SERPL-SCNC: 104 MMOL/L (ref 96–106)
CO2 SERPL-SCNC: 19 MMOL/L (ref 20–29)
CREAT SERPL-MCNC: 0.52 MG/DL (ref 0.57–1)
CREAT UR-MCNC: 196 MG/DL
EGFRCR SERPLBLD CKD-EPI 2021: 133 ML/MIN/1.73
EOSINOPHIL # BLD AUTO: 0 X10E3/UL (ref 0–0.4)
EOSINOPHIL NFR BLD AUTO: 0 %
ERYTHROCYTE [DISTWIDTH] IN BLOOD BY AUTOMATED COUNT: 13.5 % (ref 11.7–15.4)
GLOBULIN SER CALC-MCNC: 2.6 G/DL (ref 1.5–4.5)
GLUCOSE SERPL-MCNC: 82 MG/DL (ref 70–99)
HCT VFR BLD AUTO: 33.3 % (ref 34–46.6)
HGB BLD-MCNC: 10.6 G/DL (ref 11.1–15.9)
IMM GRANULOCYTES # BLD AUTO: 0 X10E3/UL (ref 0–0.1)
IMM GRANULOCYTES NFR BLD AUTO: 0 %
LYMPHOCYTES # BLD AUTO: 1.6 X10E3/UL (ref 0.7–3.1)
LYMPHOCYTES NFR BLD AUTO: 33 %
MCH RBC QN AUTO: 26.8 PG (ref 26.6–33)
MCHC RBC AUTO-ENTMCNC: 31.8 G/DL (ref 31.5–35.7)
MCV RBC AUTO: 84 FL (ref 79–97)
MONOCYTES # BLD AUTO: 0.5 X10E3/UL (ref 0.1–0.9)
MONOCYTES NFR BLD AUTO: 11 %
NEUTROPHILS # BLD AUTO: 2.7 X10E3/UL (ref 1.4–7)
NEUTROPHILS NFR BLD AUTO: 55 %
PLATELET # BLD AUTO: 244 X10E3/UL (ref 150–450)
POTASSIUM SERPL-SCNC: 4.1 MMOL/L (ref 3.5–5.2)
PROT SERPL-MCNC: 5.8 G/DL (ref 6–8.5)
PROT UR-MCNC: 18.4 MG/DL
PROT/CREAT UR: 94 MG/G CREAT (ref 0–200)
RBC # BLD AUTO: 3.95 X10E6/UL (ref 3.77–5.28)
SODIUM SERPL-SCNC: 137 MMOL/L (ref 134–144)
URATE SERPL-MCNC: 5.2 MG/DL (ref 2.6–6.2)
WBC # BLD AUTO: 4.8 X10E3/UL (ref 3.4–10.8)

## 2024-08-13 ENCOUNTER — HOSPITAL ENCOUNTER (INPATIENT)
Facility: HOSPITAL | Age: 24
LOS: 4 days | Discharge: HOME | End: 2024-08-17
Attending: OBSTETRICS & GYNECOLOGY | Admitting: OBSTETRICS & GYNECOLOGY
Payer: MEDICAID

## 2024-08-13 PROBLEM — Z3A.39 39 WEEKS GESTATION OF PREGNANCY: Status: ACTIVE | Noted: 2024-08-13

## 2024-08-13 LAB
ABO + RH BLD: NORMAL
ALBUMIN SERPL-MCNC: 2.7 G/DL (ref 3.5–5)
ALBUMIN/GLOB SERPL: 0.6 (ref 1.1–2.2)
ALP SERPL-CCNC: 153 U/L (ref 45–117)
ALT SERPL-CCNC: 15 U/L (ref 12–78)
AMPHET UR QL SCN: NEGATIVE
ANION GAP SERPL CALC-SCNC: 7 MMOL/L (ref 5–15)
AST SERPL W P-5'-P-CCNC: 20 U/L (ref 15–37)
BARBITURATES UR QL SCN: NEGATIVE
BENZODIAZ UR QL: NEGATIVE
BILIRUB SERPL-MCNC: 0.3 MG/DL (ref 0.2–1)
BLOOD GROUP ANTIBODIES SERPL: NEGATIVE
BUN SERPL-MCNC: 6 MG/DL (ref 6–20)
BUN/CREAT SERPL: 10 (ref 12–20)
CA-I BLD-MCNC: 9.1 MG/DL (ref 8.5–10.1)
CANNABINOIDS UR QL SCN: NEGATIVE
CHLORIDE SERPL-SCNC: 108 MMOL/L (ref 97–108)
CO2 SERPL-SCNC: 22 MMOL/L (ref 21–32)
COCAINE UR QL SCN: NEGATIVE
CREAT SERPL-MCNC: 0.63 MG/DL (ref 0.55–1.02)
ERYTHROCYTE [DISTWIDTH] IN BLOOD BY AUTOMATED COUNT: 13.7 % (ref 11.5–14.5)
GLOBULIN SER CALC-MCNC: 4.2 G/DL (ref 2–4)
GLUCOSE SERPL-MCNC: 66 MG/DL (ref 65–100)
HCT VFR BLD AUTO: 33.3 % (ref 35–47)
HGB BLD-MCNC: 10.8 G/DL (ref 11.5–16)
Lab: NORMAL
MCH RBC QN AUTO: 27.3 PG (ref 26–34)
MCHC RBC AUTO-ENTMCNC: 32.4 G/DL (ref 30–36.5)
MCV RBC AUTO: 84.1 FL (ref 80–99)
METHADONE UR QL: NEGATIVE
NRBC # BLD: 0 K/UL (ref 0–0.01)
NRBC BLD-RTO: 0 PER 100 WBC
OPIATES UR QL: NEGATIVE
PCP UR QL: NEGATIVE
PLATELET # BLD AUTO: 274 K/UL (ref 150–400)
PMV BLD AUTO: 11.6 FL (ref 8.9–12.9)
POTASSIUM SERPL-SCNC: 3.7 MMOL/L (ref 3.5–5.1)
PROT SERPL-MCNC: 6.9 G/DL (ref 6.4–8.2)
RBC # BLD AUTO: 3.96 M/UL (ref 3.8–5.2)
SODIUM SERPL-SCNC: 137 MMOL/L (ref 136–145)
SPECIMEN EXP DATE BLD: NORMAL
WBC # BLD AUTO: 5.3 K/UL (ref 3.6–11)

## 2024-08-13 PROCEDURE — 1120000000 HC RM PRIVATE OB

## 2024-08-13 PROCEDURE — 6370000000 HC RX 637 (ALT 250 FOR IP): Performed by: OBSTETRICS & GYNECOLOGY

## 2024-08-13 PROCEDURE — 86592 SYPHILIS TEST NON-TREP QUAL: CPT

## 2024-08-13 PROCEDURE — 86850 RBC ANTIBODY SCREEN: CPT

## 2024-08-13 PROCEDURE — 80053 COMPREHEN METABOLIC PANEL: CPT

## 2024-08-13 PROCEDURE — 86900 BLOOD TYPING SEROLOGIC ABO: CPT

## 2024-08-13 PROCEDURE — 86901 BLOOD TYPING SEROLOGIC RH(D): CPT

## 2024-08-13 PROCEDURE — 86780 TREPONEMA PALLIDUM: CPT

## 2024-08-13 PROCEDURE — 36415 COLL VENOUS BLD VENIPUNCTURE: CPT

## 2024-08-13 PROCEDURE — 80307 DRUG TEST PRSMV CHEM ANLYZR: CPT

## 2024-08-13 PROCEDURE — 85027 COMPLETE CBC AUTOMATED: CPT

## 2024-08-13 RX ORDER — METHYLERGONOVINE MALEATE 0.2 MG/ML
200 INJECTION INTRAVENOUS PRN
Status: DISCONTINUED | OUTPATIENT
Start: 2024-08-13 | End: 2024-08-15 | Stop reason: SDUPTHER

## 2024-08-13 RX ORDER — PENICILLIN G 3000000 [IU]/50ML
3 INJECTION, SOLUTION INTRAVENOUS EVERY 4 HOURS
Status: DISCONTINUED | OUTPATIENT
Start: 2024-08-15 | End: 2024-08-14

## 2024-08-13 RX ORDER — DOCUSATE SODIUM 100 MG/1
100 CAPSULE, LIQUID FILLED ORAL 2 TIMES DAILY
Status: DISCONTINUED | OUTPATIENT
Start: 2024-08-13 | End: 2024-08-17 | Stop reason: HOSPADM

## 2024-08-13 RX ORDER — TERBUTALINE SULFATE 1 MG/ML
0.25 INJECTION, SOLUTION SUBCUTANEOUS
Status: ACTIVE | OUTPATIENT
Start: 2024-08-13 | End: 2024-08-14

## 2024-08-13 RX ORDER — SODIUM CHLORIDE, SODIUM LACTATE, POTASSIUM CHLORIDE, CALCIUM CHLORIDE 600; 310; 30; 20 MG/100ML; MG/100ML; MG/100ML; MG/100ML
INJECTION, SOLUTION INTRAVENOUS CONTINUOUS
Status: DISCONTINUED | OUTPATIENT
Start: 2024-08-13 | End: 2024-08-13

## 2024-08-13 RX ORDER — SODIUM CHLORIDE, SODIUM LACTATE, POTASSIUM CHLORIDE, AND CALCIUM CHLORIDE .6; .31; .03; .02 G/100ML; G/100ML; G/100ML; G/100ML
500 INJECTION, SOLUTION INTRAVENOUS PRN
Status: DISCONTINUED | OUTPATIENT
Start: 2024-08-13 | End: 2024-08-15

## 2024-08-13 RX ORDER — SODIUM CHLORIDE, SODIUM LACTATE, POTASSIUM CHLORIDE, CALCIUM CHLORIDE 600; 310; 30; 20 MG/100ML; MG/100ML; MG/100ML; MG/100ML
INJECTION, SOLUTION INTRAVENOUS CONTINUOUS
Status: DISCONTINUED | OUTPATIENT
Start: 2024-08-14 | End: 2024-08-15

## 2024-08-13 RX ORDER — SODIUM CHLORIDE 0.9 % (FLUSH) 0.9 %
5-40 SYRINGE (ML) INJECTION EVERY 12 HOURS SCHEDULED
Status: DISCONTINUED | OUTPATIENT
Start: 2024-08-13 | End: 2024-08-15

## 2024-08-13 RX ORDER — ONDANSETRON 4 MG/1
4 TABLET, ORALLY DISINTEGRATING ORAL EVERY 6 HOURS PRN
Status: DISCONTINUED | OUTPATIENT
Start: 2024-08-13 | End: 2024-08-15

## 2024-08-13 RX ORDER — CARBOPROST TROMETHAMINE 250 UG/ML
250 INJECTION, SOLUTION INTRAMUSCULAR PRN
Status: DISCONTINUED | OUTPATIENT
Start: 2024-08-13 | End: 2024-08-17 | Stop reason: HOSPADM

## 2024-08-13 RX ORDER — MISOPROSTOL 200 UG/1
400 TABLET ORAL PRN
Status: DISCONTINUED | OUTPATIENT
Start: 2024-08-13 | End: 2024-08-17 | Stop reason: HOSPADM

## 2024-08-13 RX ORDER — SODIUM CHLORIDE 9 MG/ML
25 INJECTION, SOLUTION INTRAVENOUS PRN
Status: DISCONTINUED | OUTPATIENT
Start: 2024-08-13 | End: 2024-08-15

## 2024-08-13 RX ORDER — SODIUM CHLORIDE 0.9 % (FLUSH) 0.9 %
5-40 SYRINGE (ML) INJECTION PRN
Status: DISCONTINUED | OUTPATIENT
Start: 2024-08-13 | End: 2024-08-15

## 2024-08-13 RX ORDER — ONDANSETRON 2 MG/ML
4 INJECTION INTRAMUSCULAR; INTRAVENOUS EVERY 6 HOURS PRN
Status: DISCONTINUED | OUTPATIENT
Start: 2024-08-13 | End: 2024-08-15

## 2024-08-13 RX ADMIN — Medication 25 MCG: at 20:09

## 2024-08-13 NOTE — PROGRESS NOTES
1530: Pt arrived to unit ambulatory accompanied by s/o for scheduled IOL.    1543: Dr. Adrian at bedside to review POC with pt. Questions encouraged and addressed. Pt agreeable to plan.    1549: SVE performed by Dr. Adrian.  2/50/-3    1630: Pt may be removed from monitors until 1930 per Dr. Adrian.

## 2024-08-13 NOTE — H&P
History & Physical    Name: Jaime Cisse MRN: [id number]  SSN: [id number]    YOB: 2000  Age: 24 y.o.  Sex: female        Subjective:     Estimated Date of Delivery: 24  OB History    Para Term  AB Living   1 0 0 0 0 0   SAB IAB Ectopic Molar Multiple Live Births   0 0 0 0 0 0      # Outcome Date GA Lbr Ady/2nd Weight Sex Type Anes PTL Lv   1 Current                Ms. Cisse is admitted with pregnancy at 39w0d for induction of labor. Prenatal course was normal. Please see prenatal records for details.    Past Medical History:   Diagnosis Date    Carrier of spinal muscular atrophy     Chlamydial infection 07/15/2020    Depression     Genital herpes simplex 07/15/2020    Herpes     LGSIL on Pap smear of cervix 2021    Morbid obesity (HCC) 07/15/2020    Trauma     Vaginal irritation 2022    Vaginal irritation     Vaginal pain 07/15/2020    *Chronic     History reviewed. No pertinent surgical history.  Social History     Occupational History    Not on file   Tobacco Use    Smoking status: Every Day     Types: Cigars    Smokeless tobacco: Never   Vaping Use    Vaping status: Never Used   Substance and Sexual Activity    Alcohol use: Not Currently    Drug use: Never    Sexual activity: Yes     Partners: Male     Birth control/protection: None     Family History   Family history unknown: Yes       No Known Allergies  Prior to Admission medications    Medication Sig Start Date End Date Taking? Authorizing Provider   clotrimazole (LOTRIMIN) 2 % CREA vaginal cream Use one applicator full via vagina nightly  Patient not taking: Reported on 4/3/2024 3/18/24   Geovanna Fernandez MD   butalbital-acetaminophen-caffeine (FIORICET, ESGIC) -40 MG per tablet Take 2 tablets by mouth every 6 hours as needed for Headaches  Patient not taking: Reported on 4/3/2024 3/6/24   Geovanna Fernandez MD   sertraline (ZOLOFT) 50 MG tablet Take 1 tablet by mouth daily  Patient not taking: Reported on  4/3/2024 12/13/23   Beverley Sinha MD   SUMAtriptan (IMITREX) 50 MG tablet Take 1 tablet by mouth once as needed for Migraine 8/30/23 8/30/23  Beverley Sinha MD        Review of Systems: Pertinent items are noted in HPI.    Objective:     Vitals:  Vitals:    08/13/24 1552 08/13/24 1557 08/13/24 1602 08/13/24 1607   BP:  113/79     Pulse: 86 80     Resp:  16     Temp:  98.4 °F (36.9 °C)     TempSrc:  Oral     SpO2: 99% 99% 99% 100%   Weight:  106.6 kg (235 lb)     Height:  1.676 m (5' 6\")          Physical Exam:  EXTERNAL GENITALIA: normal appearing vulva with no masses, tenderness or lesions  CERVIX: 2 cm dilated, 50 effaced, -3 station  Membranes:  Intact  Fetal Heart Rate: Reactive    Prenatal Labs:   was normal  No results found for: \"ABORH\", \"RUBELLAEXT\", \"GRBSEXT\", \"HBSAGEXT\", \"HIVEXT\", \"RPREXT\", \"GONNOEXT\"      Assessment/Plan:     Principal Problem:    39 weeks gestation of pregnancy  Resolved Problems:    * No resolved hospital problems. *       Plan: Admit for Reassuring fetal status, Continue plan for vaginal delivery.  Group B Strep was positive, will treat prophylactically with penicillin.  Cytotec ripening, risk of failed induction, risk of csection.  States comprehension.

## 2024-08-13 NOTE — PLAN OF CARE
Problem: Vaginal Birth or  Section  Goal: Fetal and maternal status remain reassuring during the birth process  Description:  Birth OB-Pregnancy care plan goal which identifies if the fetal and maternal status remain reassuring during the birth process  Outcome: Progressing  Flowsheets (Taken 2024)  Fetal and Maternal Status Remain Reassuring During the Birth Process:   Monitor vital signs   Monitor uterine activity   Monitor fetal heart rate   Monitor labor progression (Vaginal delivery)     Problem: Postpartum  Goal: Experiences normal postpartum course  Description:  Postpartum OB-Pregnancy care plan goal which identifies if the mother is experiencing a normal postpartum course  Outcome: Progressing  Flowsheets (Taken 2024)  Experiences Normal Postpartum Course:   Monitor maternal vital signs   Assess uterine involution     Problem: Pain  Goal: Verbalizes/displays adequate comfort level or baseline comfort level  Outcome: Progressing  Flowsheets (Taken 2024)  Verbalizes/displays adequate comfort level or baseline comfort level:   Encourage patient to monitor pain and request assistance   Assess pain using appropriate pain scale   Implement non-pharmacological measures as appropriate and evaluate response   Consider cultural and social influences on pain and pain management     Problem: Infection - Adult  Goal: Absence of infection at discharge  Outcome: Progressing  Flowsheets (Taken 2024)  Absence of infection at discharge:   Assess and monitor for signs and symptoms of infection   Monitor lab/diagnostic results   Monitor all insertion sites i.e., indwelling lines, tubes and drains   Instruct and encourage patient and family to use good hand hygiene technique   Administer medications as ordered     Problem: Safety - Adult  Goal: Free from fall injury  Outcome: Progressing  Flowsheets (Taken 2024)  Free From Fall Injury:   Instruct family/caregiver on  patient safety   Based on caregiver fall risk screen, instruct family/caregiver to ask for assistance with transferring infant if caregiver noted to have fall risk factors     Problem: Discharge Planning  Goal: Discharge to home or other facility with appropriate resources  Outcome: Progressing  Flowsheets (Taken 8/13/2024 0000)  Discharge to home or other facility with appropriate resources:   Identify barriers to discharge with patient and caregiver   Arrange for needed discharge resources and transportation as appropriate   Identify discharge learning needs (meds, wound care, etc)

## 2024-08-14 ENCOUNTER — ANESTHESIA EVENT (OUTPATIENT)
Facility: HOSPITAL | Age: 24
End: 2024-08-14
Payer: MEDICAID

## 2024-08-14 ENCOUNTER — ANESTHESIA (OUTPATIENT)
Facility: HOSPITAL | Age: 24
End: 2024-08-14
Payer: MEDICAID

## 2024-08-14 LAB
RPR SER QL: NONREACTIVE
T PALLIDUM AB SER QL IA: NON REACTIVE

## 2024-08-14 PROCEDURE — 6370000000 HC RX 637 (ALT 250 FOR IP): Performed by: OBSTETRICS & GYNECOLOGY

## 2024-08-14 PROCEDURE — 2580000003 HC RX 258: Performed by: OBSTETRICS & GYNECOLOGY

## 2024-08-14 PROCEDURE — 1120000000 HC RM PRIVATE OB

## 2024-08-14 PROCEDURE — 3E0P7VZ INTRODUCTION OF HORMONE INTO FEMALE REPRODUCTIVE, VIA NATURAL OR ARTIFICIAL OPENING: ICD-10-PCS | Performed by: OBSTETRICS & GYNECOLOGY

## 2024-08-14 PROCEDURE — 2500000003 HC RX 250 WO HCPCS: Performed by: ANESTHESIOLOGY

## 2024-08-14 PROCEDURE — 6360000002 HC RX W HCPCS: Performed by: OBSTETRICS & GYNECOLOGY

## 2024-08-14 PROCEDURE — 6360000002 HC RX W HCPCS: Performed by: ANESTHESIOLOGY

## 2024-08-14 PROCEDURE — 00HU33Z INSERTION OF INFUSION DEVICE INTO SPINAL CANAL, PERCUTANEOUS APPROACH: ICD-10-PCS | Performed by: ANESTHESIOLOGY

## 2024-08-14 RX ORDER — BUPIVACAINE HYDROCHLORIDE 2.5 MG/ML
INJECTION, SOLUTION EPIDURAL; INFILTRATION; INTRACAUDAL
Status: COMPLETED
Start: 2024-08-14 | End: 2024-08-14

## 2024-08-14 RX ORDER — BUPIVACAINE HYDROCHLORIDE 2.5 MG/ML
INJECTION, SOLUTION EPIDURAL; INFILTRATION; INTRACAUDAL PRN
Status: DISCONTINUED | OUTPATIENT
Start: 2024-08-14 | End: 2024-08-15 | Stop reason: SDUPTHER

## 2024-08-14 RX ORDER — NALOXONE HYDROCHLORIDE 0.4 MG/ML
INJECTION, SOLUTION INTRAMUSCULAR; INTRAVENOUS; SUBCUTANEOUS PRN
Status: DISCONTINUED | OUTPATIENT
Start: 2024-08-14 | End: 2024-08-15

## 2024-08-14 RX ORDER — NALBUPHINE HYDROCHLORIDE 10 MG/ML
5 INJECTION, SOLUTION INTRAMUSCULAR; INTRAVENOUS; SUBCUTANEOUS EVERY 4 HOURS PRN
Status: DISCONTINUED | OUTPATIENT
Start: 2024-08-14 | End: 2024-08-15

## 2024-08-14 RX ORDER — ONDANSETRON 2 MG/ML
4 INJECTION INTRAMUSCULAR; INTRAVENOUS EVERY 6 HOURS PRN
Status: DISCONTINUED | OUTPATIENT
Start: 2024-08-14 | End: 2024-08-15

## 2024-08-14 RX ORDER — LIDOCAINE HYDROCHLORIDE AND EPINEPHRINE BITARTRATE 20; .01 MG/ML; MG/ML
INJECTION, SOLUTION SUBCUTANEOUS PRN
Status: DISCONTINUED | OUTPATIENT
Start: 2024-08-14 | End: 2024-08-15 | Stop reason: SDUPTHER

## 2024-08-14 RX ORDER — FENTANYL/BUPIVACAINE/NS/PF 2-1250MCG
10 PLASTIC BAG, INJECTION (ML) INJECTION CONTINUOUS
Status: DISCONTINUED | OUTPATIENT
Start: 2024-08-14 | End: 2024-08-15

## 2024-08-14 RX ORDER — FENTANYL CITRATE 50 UG/ML
50 INJECTION, SOLUTION INTRAMUSCULAR; INTRAVENOUS
Status: DISCONTINUED | OUTPATIENT
Start: 2024-08-14 | End: 2024-08-15

## 2024-08-14 RX ORDER — PENICILLIN G 3000000 [IU]/50ML
3 INJECTION, SOLUTION INTRAVENOUS EVERY 4 HOURS
Status: DISCONTINUED | OUTPATIENT
Start: 2024-08-15 | End: 2024-08-14

## 2024-08-14 RX ORDER — PENICILLIN G 3000000 [IU]/50ML
3 INJECTION, SOLUTION INTRAVENOUS EVERY 4 HOURS
Status: DISCONTINUED | OUTPATIENT
Start: 2024-08-14 | End: 2024-08-15

## 2024-08-14 RX ORDER — BUPIVACAINE HYDROCHLORIDE 2.5 MG/ML
10 INJECTION, SOLUTION EPIDURAL; INFILTRATION; INTRACAUDAL ONCE
Status: DISCONTINUED | OUTPATIENT
Start: 2024-08-14 | End: 2024-08-15

## 2024-08-14 RX ADMIN — FENTANYL CITRATE 50 MCG: 50 INJECTION, SOLUTION INTRAMUSCULAR; INTRAVENOUS at 12:10

## 2024-08-14 RX ADMIN — PENICILLIN G 3 MILLION UNITS: 3000000 INJECTION, SOLUTION INTRAVENOUS at 05:22

## 2024-08-14 RX ADMIN — BUPIVACAINE HYDROCHLORIDE 0.5 ML: 2.5 INJECTION, SOLUTION EPIDURAL; INFILTRATION; INTRACAUDAL; PERINEURAL at 12:51

## 2024-08-14 RX ADMIN — PENICILLIN G 3 MILLION UNITS: 3000000 INJECTION, SOLUTION INTRAVENOUS at 17:48

## 2024-08-14 RX ADMIN — BUPIVACAINE HYDROCHLORIDE 10 ML: 2.5 INJECTION, SOLUTION EPIDURAL; INFILTRATION; INTRACAUDAL; PERINEURAL at 20:41

## 2024-08-14 RX ADMIN — SODIUM CHLORIDE, POTASSIUM CHLORIDE, SODIUM LACTATE AND CALCIUM CHLORIDE 125 ML/HR: 600; 310; 30; 20 INJECTION, SOLUTION INTRAVENOUS at 21:43

## 2024-08-14 RX ADMIN — PENICILLIN G 3 MILLION UNITS: 3000000 INJECTION, SOLUTION INTRAVENOUS at 22:30

## 2024-08-14 RX ADMIN — Medication 25 MCG: at 00:54

## 2024-08-14 RX ADMIN — SODIUM CHLORIDE 5 MILLION UNITS: 900 INJECTION INTRAVENOUS at 00:59

## 2024-08-14 RX ADMIN — LIDOCAINE HYDROCHLORIDE,EPINEPHRINE BITARTRATE 3 ML: 20; .01 INJECTION, SOLUTION INFILTRATION; PERINEURAL at 12:53

## 2024-08-14 RX ADMIN — PENICILLIN G 3 MILLION UNITS: 3000000 INJECTION, SOLUTION INTRAVENOUS at 13:00

## 2024-08-14 RX ADMIN — SODIUM CHLORIDE, POTASSIUM CHLORIDE, SODIUM LACTATE AND CALCIUM CHLORIDE 500 ML: 600; 310; 30; 20 INJECTION, SOLUTION INTRAVENOUS at 12:22

## 2024-08-14 RX ADMIN — Medication 25 MCG: at 04:23

## 2024-08-14 RX ADMIN — Medication 2 MILLI-UNITS/MIN: at 08:50

## 2024-08-14 RX ADMIN — FENTANYL CITRATE 50 MCG: 50 INJECTION, SOLUTION INTRAMUSCULAR; INTRAVENOUS at 10:42

## 2024-08-14 RX ADMIN — Medication 10 ML/HR: at 13:17

## 2024-08-14 RX ADMIN — SODIUM CHLORIDE, POTASSIUM CHLORIDE, SODIUM LACTATE AND CALCIUM CHLORIDE: 600; 310; 30; 20 INJECTION, SOLUTION INTRAVENOUS at 00:58

## 2024-08-14 RX ADMIN — SODIUM CHLORIDE, POTASSIUM CHLORIDE, SODIUM LACTATE AND CALCIUM CHLORIDE: 600; 310; 30; 20 INJECTION, SOLUTION INTRAVENOUS at 16:18

## 2024-08-14 RX ADMIN — Medication 10 ML/HR: at 22:29

## 2024-08-14 NOTE — ANESTHESIA PRE PROCEDURE
Department of Anesthesiology  Preprocedure Note       Name:  Jaime Cisse   Age:  24 y.o.  :  2000                                          MRN:  [id number]         Date:  2024      Surgeon: * No surgeons listed *    Procedure: * No procedures listed *    Medications prior to admission:   Prior to Admission medications    Medication Sig Start Date End Date Taking? Authorizing Provider   clotrimazole (LOTRIMIN) 2 % CREA vaginal cream Use one applicator full via vagina nightly  Patient not taking: Reported on 4/3/2024 3/18/24   Geovanna Fernandez MD   butalbital-acetaminophen-caffeine (FIORICET, ESGIC) -40 MG per tablet Take 2 tablets by mouth every 6 hours as needed for Headaches  Patient not taking: Reported on 4/3/2024 3/6/24   Geovanna Fernandez MD   sertraline (ZOLOFT) 50 MG tablet Take 1 tablet by mouth daily  Patient not taking: Reported on 4/3/2024 12/13/23   Beverley Sinha MD   SUMAtriptan (IMITREX) 50 MG tablet Take 1 tablet by mouth once as needed for Migraine 23  Beverley Sinha MD       Current medications:    Current Facility-Administered Medications   Medication Dose Route Frequency Provider Last Rate Last Admin    penicillin G potassium IVPB 3 Million Units  3 Million Units IntraVENous Q4H Dory Spicer  mL/hr at 24 0522 Restarted at 24 0925    oxytocin (PITOCIN) 30 units in 500 mL infusion  1-20 milton-units/min IntraVENous Continuous Geovanna Fernandez MD 6 mL/hr at 24 1213 6 milton-units/min at 24 1213    fentaNYL (SUBLIMAZE) injection 50 mcg  50 mcg IntraVENous Q1H PRN Geovanna Fernandez MD   50 mcg at 24 1210    lactated ringers bolus 500 mL  500 mL IntraVENous PRN Dory Spicer MD        Or    lactated ringers bolus 500 mL  500 mL IntraVENous PRN Dory Spicer .7 mL/hr at 24 1222 500 mL at 24 1222    sodium chloride flush 0.9 % injection 5-40 mL  5-40 mL IntraVENous 2 times per day

## 2024-08-14 NOTE — ANESTHESIA PROCEDURE NOTES
CSE Block    Patient location during procedure: OB  Start time: 8/14/2024 12:48 PM  End time: 8/14/2024 12:57 PM  Reason for block: labor epidural  Staffing  Performed: anesthesiologist   Anesthesiologist: Waqar Ca MD  Performed by: Waqar Ca MD  Authorized by: Waqar Ca MD    CSE  Patient position: sitting  Prep: ChloraPrep  Patient monitoring: cardiac monitor, continuous pulse ox, continuous capnometry and frequent blood pressure checks  Approach: midline  Provider prep: mask and sterile gloves  Spinal Needle  Needle type: pencil-tip   Needle gauge: 25 G  Needle length: 4.75 in  Epidural Needle  Injection technique: NORA saline  Needle type: Tuohy   Needle gauge: 17 G  Needle length: 3.5 in  Needle insertion depth: 5 cm  Location: lumbar (1-5)  Catheter  Catheter type: side hole  Catheter size: 19 G  Catheter at skin depth: 10 cm  Test dose: negative  Assessment  Hemodynamics: stable  Preanesthetic Checklist  Completed: patient identified, IV checked, site marked, risks and benefits discussed, surgical/procedural consents, equipment checked, pre-op evaluation, timeout performed, anesthesia consent given, oxygen available, monitors applied/VS acknowledged, fire risk safety assessment completed and verbalized and blood product R/B/A discussed and consented

## 2024-08-15 PROBLEM — Z34.90 ENCOUNTER FOR ELECTIVE INDUCTION OF LABOR: Status: ACTIVE | Noted: 2024-08-15

## 2024-08-15 PROCEDURE — 6370000000 HC RX 637 (ALT 250 FOR IP): Performed by: OBSTETRICS & GYNECOLOGY

## 2024-08-15 PROCEDURE — 59400 OBSTETRICAL CARE: CPT | Performed by: OBSTETRICS & GYNECOLOGY

## 2024-08-15 PROCEDURE — 1120000000 HC RM PRIVATE OB

## 2024-08-15 PROCEDURE — 2580000003 HC RX 258: Performed by: OBSTETRICS & GYNECOLOGY

## 2024-08-15 PROCEDURE — 6360000002 HC RX W HCPCS: Performed by: OBSTETRICS & GYNECOLOGY

## 2024-08-15 PROCEDURE — 7210000100 HC LABOR FEE PER 1 HR

## 2024-08-15 PROCEDURE — 7220000101 HC DELIVERY VAGINAL/SINGLE

## 2024-08-15 RX ORDER — ONDANSETRON 2 MG/ML
4 INJECTION INTRAMUSCULAR; INTRAVENOUS EVERY 6 HOURS PRN
Status: DISCONTINUED | OUTPATIENT
Start: 2024-08-15 | End: 2024-08-17 | Stop reason: HOSPADM

## 2024-08-15 RX ORDER — SENNA AND DOCUSATE SODIUM 50; 8.6 MG/1; MG/1
1 TABLET, FILM COATED ORAL DAILY
Status: DISCONTINUED | OUTPATIENT
Start: 2024-08-15 | End: 2024-08-17 | Stop reason: HOSPADM

## 2024-08-15 RX ORDER — SODIUM CHLORIDE 0.9 % (FLUSH) 0.9 %
5-40 SYRINGE (ML) INJECTION EVERY 12 HOURS SCHEDULED
Status: DISCONTINUED | OUTPATIENT
Start: 2024-08-15 | End: 2024-08-15

## 2024-08-15 RX ORDER — SODIUM CHLORIDE 9 MG/ML
INJECTION, SOLUTION INTRAVENOUS PRN
Status: DISCONTINUED | OUTPATIENT
Start: 2024-08-15 | End: 2024-08-15

## 2024-08-15 RX ORDER — METHYLERGONOVINE MALEATE 0.2 MG/ML
200 INJECTION INTRAVENOUS PRN
Status: DISCONTINUED | OUTPATIENT
Start: 2024-08-15 | End: 2024-08-17 | Stop reason: HOSPADM

## 2024-08-15 RX ORDER — FLUCONAZOLE 100 MG/1
200 TABLET ORAL DAILY
Status: COMPLETED | OUTPATIENT
Start: 2024-08-15 | End: 2024-08-15

## 2024-08-15 RX ORDER — OXYCODONE HYDROCHLORIDE 10 MG/1
10 TABLET ORAL EVERY 4 HOURS PRN
Status: DISCONTINUED | OUTPATIENT
Start: 2024-08-15 | End: 2024-08-17 | Stop reason: HOSPADM

## 2024-08-15 RX ORDER — MODIFIED LANOLIN
OINTMENT (GRAM) TOPICAL PRN
Status: DISCONTINUED | OUTPATIENT
Start: 2024-08-15 | End: 2024-08-17 | Stop reason: HOSPADM

## 2024-08-15 RX ORDER — POLYETHYLENE GLYCOL 3350 17 G/17G
17 POWDER, FOR SOLUTION ORAL DAILY PRN
Status: DISCONTINUED | OUTPATIENT
Start: 2024-08-15 | End: 2024-08-17 | Stop reason: HOSPADM

## 2024-08-15 RX ORDER — FAMOTIDINE 20 MG/1
20 TABLET, FILM COATED ORAL 2 TIMES DAILY PRN
Status: DISCONTINUED | OUTPATIENT
Start: 2024-08-15 | End: 2024-08-17 | Stop reason: HOSPADM

## 2024-08-15 RX ORDER — SIMETHICONE 80 MG
80 TABLET,CHEWABLE ORAL EVERY 6 HOURS PRN
Status: DISCONTINUED | OUTPATIENT
Start: 2024-08-15 | End: 2024-08-17 | Stop reason: HOSPADM

## 2024-08-15 RX ORDER — OXYCODONE HYDROCHLORIDE 5 MG/1
5 TABLET ORAL EVERY 4 HOURS PRN
Status: DISCONTINUED | OUTPATIENT
Start: 2024-08-15 | End: 2024-08-17 | Stop reason: HOSPADM

## 2024-08-15 RX ORDER — IBUPROFEN 800 MG/1
800 TABLET ORAL EVERY 8 HOURS SCHEDULED
Status: DISCONTINUED | OUTPATIENT
Start: 2024-08-15 | End: 2024-08-17 | Stop reason: HOSPADM

## 2024-08-15 RX ORDER — ACETAMINOPHEN 500 MG
1000 TABLET ORAL EVERY 8 HOURS SCHEDULED
Status: DISCONTINUED | OUTPATIENT
Start: 2024-08-15 | End: 2024-08-17 | Stop reason: HOSPADM

## 2024-08-15 RX ORDER — SODIUM CHLORIDE 0.9 % (FLUSH) 0.9 %
5-40 SYRINGE (ML) INJECTION PRN
Status: DISCONTINUED | OUTPATIENT
Start: 2024-08-15 | End: 2024-08-15

## 2024-08-15 RX ORDER — LACTULOSE 10 G/15ML
10 SOLUTION ORAL 2 TIMES DAILY PRN
Status: DISCONTINUED | OUTPATIENT
Start: 2024-08-15 | End: 2024-08-17 | Stop reason: HOSPADM

## 2024-08-15 RX ORDER — ONDANSETRON 4 MG/1
4 TABLET, ORALLY DISINTEGRATING ORAL EVERY 6 HOURS PRN
Status: DISCONTINUED | OUTPATIENT
Start: 2024-08-15 | End: 2024-08-17 | Stop reason: HOSPADM

## 2024-08-15 RX ORDER — MISOPROSTOL 200 UG/1
200 TABLET ORAL PRN
Status: DISCONTINUED | OUTPATIENT
Start: 2024-08-15 | End: 2024-08-17 | Stop reason: HOSPADM

## 2024-08-15 RX ADMIN — Medication 87.3 MILLI-UNITS/MIN: at 07:07

## 2024-08-15 RX ADMIN — SODIUM CHLORIDE, POTASSIUM CHLORIDE, SODIUM LACTATE AND CALCIUM CHLORIDE: 600; 310; 30; 20 INJECTION, SOLUTION INTRAVENOUS at 04:19

## 2024-08-15 RX ADMIN — IBUPROFEN 800 MG: 800 TABLET, FILM COATED ORAL at 09:00

## 2024-08-15 RX ADMIN — DOCUSATE SODIUM 100 MG: 100 CAPSULE, LIQUID FILLED ORAL at 09:00

## 2024-08-15 RX ADMIN — PENICILLIN G 3 MILLION UNITS: 3000000 INJECTION, SOLUTION INTRAVENOUS at 05:17

## 2024-08-15 RX ADMIN — Medication 166.7 ML: at 07:08

## 2024-08-15 RX ADMIN — FLUCONAZOLE 200 MG: 100 TABLET ORAL at 10:24

## 2024-08-15 RX ADMIN — ACETAMINOPHEN 1000 MG: 500 TABLET ORAL at 20:41

## 2024-08-15 RX ADMIN — PENICILLIN G 3 MILLION UNITS: 3000000 INJECTION, SOLUTION INTRAVENOUS at 01:46

## 2024-08-15 RX ADMIN — DOCUSATE SODIUM 100 MG: 100 CAPSULE, LIQUID FILLED ORAL at 20:41

## 2024-08-15 RX ADMIN — IBUPROFEN 800 MG: 800 TABLET, FILM COATED ORAL at 16:13

## 2024-08-15 ASSESSMENT — PAIN SCALES - GENERAL: PAINLEVEL_OUTOF10: 0

## 2024-08-15 NOTE — PLAN OF CARE
Problem: Vaginal Birth or  Section  Goal: Fetal and maternal status remain reassuring during the birth process  Description:  Birth OB-Pregnancy care plan goal which identifies if the fetal and maternal status remain reassuring during the birth process  8/15/2024 0819 by Orin Le RN  Outcome: Progressing  8/15/2024 0753 by Leena Khan RN  Outcome: Progressing     Problem: Postpartum  Goal: Experiences normal postpartum course  Description:  Postpartum OB-Pregnancy care plan goal which identifies if the mother is experiencing a normal postpartum course  8/15/2024 0916 by Radha Waddell RN  Outcome: Progressing  Flowsheets (Taken 8/15/2024 0916)  Experiences Normal Postpartum Course:   Monitor maternal vital signs   Assess uterine involution  8/15/2024 0819 by Orin Le RN  Outcome: Progressing  Flowsheets (Taken 8/15/2024 0715 by Leena Khan RN)  Experiences Normal Postpartum Course: Monitor maternal vital signs  Goal: Appropriate maternal -  bonding  Description:  Postpartum OB-Pregnancy care plan goal which identifies if the mother and  are bonding appropriately  8/15/2024 0916 by Radha Waddell RN  Outcome: Progressing  8/15/2024 0819 by Orin Le RN  Outcome: Progressing  Goal: Establishment of infant feeding pattern  Description:  Postpartum OB-Pregnancy care plan goal which identifies if the mother is establishing a feeding pattern with their   8/15/2024 0916 by Radha Waddell RN  Outcome: Progressing  Flowsheets (Taken 8/15/2024 0916)  Establishment of Infant Feeding Pattern: Assess breast/bottle feeding  8/15/2024 0819 by Orin Le RN  Outcome: Progressing  Goal: Incisions, wounds, or drain sites healing without S/S of infection  8/15/2024 0819 by Orin Le RN  Outcome: Progressing     Problem: Pain  Goal: Verbalizes/displays adequate comfort level or baseline comfort level  8/15/2024 0916 by Radha Waddell RN  Outcome:  Progressing  Flowsheets (Taken 8/15/2024 0715 by Leena Khan RN)  Verbalizes/displays adequate comfort level or baseline comfort level:   Encourage patient to monitor pain and request assistance   Assess pain using appropriate pain scale   Administer analgesics based on type and severity of pain and evaluate response   Consider cultural and social influences on pain and pain management   Implement non-pharmacological measures as appropriate and evaluate response  8/15/2024 0819 by Orin Le RN  Outcome: Progressing  8/15/2024 0753 by Leena Khan RN  Outcome: Progressing  Flowsheets (Taken 8/15/2024 0715)  Verbalizes/displays adequate comfort level or baseline comfort level:   Encourage patient to monitor pain and request assistance   Assess pain using appropriate pain scale   Administer analgesics based on type and severity of pain and evaluate response   Consider cultural and social influences on pain and pain management   Implement non-pharmacological measures as appropriate and evaluate response     Problem: Infection - Adult  Goal: Absence of infection at discharge  8/15/2024 0916 by Radha Waddell RN  Outcome: Progressing  Flowsheets (Taken 8/13/2024 1642)  Absence of infection at discharge:   Assess and monitor for signs and symptoms of infection   Monitor lab/diagnostic results   Monitor all insertion sites i.e., indwelling lines, tubes and drains   Instruct and encourage patient and family to use good hand hygiene technique   Administer medications as ordered  8/15/2024 0819 by Orin Le RN  Outcome: Progressing  8/15/2024 0753 by Leena Khan RN  Outcome: Progressing  Goal: Absence of infection during hospitalization  8/15/2024 0819 by Orin Le RN  Outcome: Progressing  8/15/2024 0753 by Leena Khan RN  Outcome: Progressing  Goal: Absence of fever/infection during anticipated neutropenic period  8/15/2024 0819 by Orin Le RN  Outcome: Progressing  8/15/2024

## 2024-08-15 NOTE — ANESTHESIA POSTPROCEDURE EVALUATION
Department of Anesthesiology  Postprocedure Note    Patient: Jaime Cisse  MRN: [id number]  YOB: 2000  Date of evaluation: 8/15/2024    Procedure Summary       Date: 08/14/24 Room / Location:     Anesthesia Start: 1244 Anesthesia Stop: 08/15/24 0703    Procedure: Labor Analgesia Diagnosis:     Scheduled Providers:  Responsible Provider: Waqar Ca MD    Anesthesia Type: CSE ASA Status: 2            Anesthesia Type: No value filed.    Ang Phase I:      Ang Phase II:      Anesthesia Post Evaluation    Patient location during evaluation: floor  Patient participation: complete - patient participated  Level of consciousness: awake  Pain score: 0  Airway patency: patent  Nausea & Vomiting: no nausea and no vomiting  Cardiovascular status: hemodynamically stable  Respiratory status: acceptable  Hydration status: stable  Multimodal analgesia pain management approach    No notable events documented.

## 2024-08-15 NOTE — PROGRESS NOTES
OB HOSPITALIST NOTE      SUBJECTIVE  Comfortable with Epidural     OBJECTIVE  Vitals:    24 2336 08/15/24 0054 08/15/24 0241 08/15/24 0321   BP: 116/70 131/76 128/80 121/80   Pulse: 86 80 69 86   Resp: 16 18 16 16   Temp: 98.2 °F (36.8 °C) 97.9 °F (36.6 °C) 98 °F (36.7 °C) 98.3 °F (36.8 °C)   TempSrc: Oral Oral Oral Oral   SpO2: 100% 100% 100% 100%   Weight:       Height:         Cx ant lip/c/+1/v  Pt can push past lip    FHT baseline 125 with mod variability + Accels  + variable decels    ASSESSMENT  Principal Problem:    Encounter for elective induction of labor  Active Problems:    39 weeks gestation of pregnancy  Resolved Problems:    * No resolved hospital problems. *     PLAN  Continue pushing & plans for     Ignacio Desai MD, FACOG  OB Hospitalist  August 15, 2024 6:02 AM

## 2024-08-15 NOTE — PROGRESS NOTES
1200: Patient arrived to the unit, bed 326 from L&D. Received report from MIRIAN Waddell RN. Vital signs and assessment completed. Admission paperwork and gift given. Patient verbalized understanding of information given. Call bell within reach and bed in lowest position.

## 2024-08-15 NOTE — PROGRESS NOTES
2934 - Dr. Desai called for an update on the pt. Dr. Desai made aware of pit status and BRYANNA Khan RN is performing an SVE and will call back to give further update with assessment.     8098 - Dr. Desai notified pt has a thick anterior lip per BRYANNA Khan RN SVE assessment.

## 2024-08-15 NOTE — L&D DELIVERY NOTE
TannaCrescencio lara Jaime [[id number]]      Labor Events     Labor: No   Steroids: None  Cervical Ripening Date/Time:      Cervical Ripening Type: Misoprostol  Antibiotics Received during Labor: Yes  Rupture Date/Time:  24 07:51:00   Rupture Type: AROM  Fluid Color: Clear  Fluid Odor: None  Fluid Volume: Moderate  Induction: Misoprostol  Augmentation: AROM, Oxytocin       Anesthesia    Method: Epidural       Delivery Details      Delivery Date: 8/15/24 Delivery Time: 07:03:00   Delivery Type: Vaginal, Spontaneous               Presentation    Presentation: Vertex  _: Occiput  _: Anterior       Shoulder Dystocia    Shoulder Dystocia Present?: No       Assisted Delivery Details    Forceps Attempted?: No  Vacuum Extractor Attempted?: No                           Cord    Vessels: 3 Vessels  Complications: Nuchal Loose  Cord Around: Head  Delayed Cord Clamping?: Yes  Cord Clamped Date/Time: 8/15/2024 07:05:22  Cord Blood Disposition: Lab  Gases Sent?: No              Placenta    Date/Time: 8/15/2024 07:07:00  Removal: Expressed  Appearance: Intact  Disposition: Discarded       Lacerations    Episiotomy: None  Perineal Lacerations: None  Other Lacerations: no non-perineal laceration       Vaginal Counts    Initial Count Personnel: AC ALMARAZ RN  Initial Count Verified By: BELL HOLGUIN RN  Intial Sponge Count: Correct Intial Needles Count: Correct Intial Instruments Count: Correct          Blood Loss  Mother: Jaime Cisse #[id number]     Start of Mother's Information      Delivery Blood Loss  24 1903 - 08/15/24 0712      None                 End of Mother's Information  Mother: Jaime Cisse #[id number]                Delivery Providers    Delivering clinician: Ignacio Desai II, MD     Provider Role    Ignacio Desai II, MD Obstetrician    Leena Khan RN Primary Nurse     Primary  Nurse     NICU Nurse     Neonatologist     Anesthesiologist     Nurse Anesthetist     Nurse Practitioner

## 2024-08-15 NOTE — PROGRESS NOTES
0730-Bedside shift report rcvd from BRYANNA Khan RN.   0997-Stand by assisted pt to BR, diego-care performed independently, gown changed, new undergarments and socks provided. Unable to void at this time. Pt requested oral treatment for a yeast infection, reported vaginal cream was unsuccessful at treating. MD notified.   1130-Pt voided 300mL.   1145-Transferred pt, with infant via wheelchair, with significant other and all belongings to [facility] room 326, bedside report was given to MARCIE Pineda RN.

## 2024-08-15 NOTE — PROGRESS NOTES
OB HOSPITALIST NOTE      SUBJECTIVE  Comfortable with epidural     OBJECTIVE  Vitals:    24 1815 24 1830 24 1929 24 1944   BP: 124/79 (!) 144/63  (!) 113/59   Pulse: 72   71   Resp:       Temp:   98.3 °F (36.8 °C)    TempSrc:   Oral    SpO2: 100%      Weight:       Height:         Abdomen soft NT  Cx 8/80/-1/V  IUPC placed    Ucs Q3 but difficult to monitor.   baseline with moderate variability  + Accels + variable decels.     ASSESSMENT  Principal Problem:    39 weeks gestation of pregnancy  Resolved Problems:    * No resolved hospital problems. *      PLAN  Continue plan for   Optimize pitocin     Ignacio Desai MD, FACOG  OB Hospitalist  2024 9:27 PM

## 2024-08-16 PROBLEM — A74.9 CHLAMYDIAL INFECTION: Status: RESOLVED | Noted: 2020-07-15 | Resolved: 2024-08-16

## 2024-08-16 PROBLEM — R10.2 VAGINAL PAIN: Status: RESOLVED | Noted: 2020-07-15 | Resolved: 2024-08-16

## 2024-08-16 PROBLEM — Z3A.39 39 WEEKS GESTATION OF PREGNANCY: Status: RESOLVED | Noted: 2024-08-13 | Resolved: 2024-08-16

## 2024-08-16 PROBLEM — E66.01 MORBID OBESITY (HCC): Status: RESOLVED | Noted: 2020-07-15 | Resolved: 2024-08-16

## 2024-08-16 PROBLEM — Z34.90 ENCOUNTER FOR ELECTIVE INDUCTION OF LABOR: Status: RESOLVED | Noted: 2024-08-15 | Resolved: 2024-08-16

## 2024-08-16 PROBLEM — Z34.90 NORMAL PREGNANCY: Status: RESOLVED | Noted: 2024-01-10 | Resolved: 2024-08-16

## 2024-08-16 PROBLEM — E66.9 OBESITY (BMI 35.0-39.9 WITHOUT COMORBIDITY): Status: RESOLVED | Noted: 2023-08-30 | Resolved: 2024-08-16

## 2024-08-16 PROBLEM — F33.1 MODERATE RECURRENT MAJOR DEPRESSION (HCC): Status: RESOLVED | Noted: 2023-08-30 | Resolved: 2024-08-16

## 2024-08-16 PROBLEM — A60.00 GENITAL HERPES SIMPLEX: Status: RESOLVED | Noted: 2020-07-15 | Resolved: 2024-08-16

## 2024-08-16 PROBLEM — Z14.8 CARRIER OF SPINAL MUSCULAR ATROPHY: Status: RESOLVED | Noted: 2024-03-06 | Resolved: 2024-08-16

## 2024-08-16 PROBLEM — G43.709 CHRONIC MIGRAINE WITHOUT AURA WITHOUT STATUS MIGRAINOSUS, NOT INTRACTABLE: Status: RESOLVED | Noted: 2023-08-30 | Resolved: 2024-08-16

## 2024-08-16 PROBLEM — Z34.90 PREGNANCY: Status: RESOLVED | Noted: 2024-01-10 | Resolved: 2024-08-16

## 2024-08-16 LAB
ERYTHROCYTE [DISTWIDTH] IN BLOOD BY AUTOMATED COUNT: 14.1 % (ref 11.5–14.5)
HCT VFR BLD AUTO: 27.2 % (ref 35–47)
HGB BLD-MCNC: 8.9 G/DL (ref 11.5–16)
MCH RBC QN AUTO: 27.9 PG (ref 26–34)
MCHC RBC AUTO-ENTMCNC: 32.7 G/DL (ref 30–36.5)
MCV RBC AUTO: 85.3 FL (ref 80–99)
NRBC # BLD: 0 K/UL (ref 0–0.01)
NRBC BLD-RTO: 0 PER 100 WBC
PLATELET # BLD AUTO: 201 K/UL (ref 150–400)
PMV BLD AUTO: 11.6 FL (ref 8.9–12.9)
RBC # BLD AUTO: 3.19 M/UL (ref 3.8–5.2)
WBC # BLD AUTO: 9.3 K/UL (ref 3.6–11)

## 2024-08-16 PROCEDURE — 6370000000 HC RX 637 (ALT 250 FOR IP): Performed by: OBSTETRICS & GYNECOLOGY

## 2024-08-16 PROCEDURE — 85027 COMPLETE CBC AUTOMATED: CPT

## 2024-08-16 PROCEDURE — 1120000000 HC RM PRIVATE OB

## 2024-08-16 PROCEDURE — 36415 COLL VENOUS BLD VENIPUNCTURE: CPT

## 2024-08-16 PROCEDURE — 99024 POSTOP FOLLOW-UP VISIT: CPT | Performed by: OBSTETRICS & GYNECOLOGY

## 2024-08-16 RX ORDER — OXYCODONE HYDROCHLORIDE 5 MG/1
5 TABLET ORAL EVERY 6 HOURS PRN
Qty: 8 TABLET | Refills: 0 | Status: SHIPPED | OUTPATIENT
Start: 2024-08-16 | End: 2024-08-18

## 2024-08-16 RX ORDER — IBUPROFEN 800 MG/1
800 TABLET ORAL EVERY 8 HOURS PRN
Qty: 30 TABLET | Refills: 0 | Status: SHIPPED | OUTPATIENT
Start: 2024-08-16 | End: 2024-08-26

## 2024-08-16 RX ADMIN — ACETAMINOPHEN 1000 MG: 500 TABLET ORAL at 12:38

## 2024-08-16 RX ADMIN — DOCUSATE SODIUM 100 MG: 100 CAPSULE, LIQUID FILLED ORAL at 20:29

## 2024-08-16 RX ADMIN — DOCUSATE SODIUM 100 MG: 100 CAPSULE, LIQUID FILLED ORAL at 08:29

## 2024-08-16 RX ADMIN — IBUPROFEN 800 MG: 800 TABLET, FILM COATED ORAL at 16:38

## 2024-08-16 RX ADMIN — ACETAMINOPHEN 1000 MG: 500 TABLET ORAL at 20:28

## 2024-08-16 RX ADMIN — IBUPROFEN 800 MG: 800 TABLET, FILM COATED ORAL at 07:12

## 2024-08-16 ASSESSMENT — PAIN SCALES - GENERAL
PAINLEVEL_OUTOF10: 2
PAINLEVEL_OUTOF10: 0

## 2024-08-16 ASSESSMENT — PAIN DESCRIPTION - ORIENTATION: ORIENTATION: LOWER

## 2024-08-16 ASSESSMENT — PAIN DESCRIPTION - DESCRIPTORS: DESCRIPTORS: SORE

## 2024-08-16 ASSESSMENT — PAIN DESCRIPTION - LOCATION: LOCATION: BACK

## 2024-08-16 ASSESSMENT — PAIN - FUNCTIONAL ASSESSMENT: PAIN_FUNCTIONAL_ASSESSMENT: ACTIVITIES ARE NOT PREVENTED

## 2024-08-16 NOTE — DISCHARGE SUMMARY
Patient ID:[id number]     DISCHARGE SUMMARY    ADMITTING DIAGNOSIS:  IUP at term    DISCHARGE DIAGNOSIS:  IUP at term delivered    PROCEDURES PERFORMED: Vaginal delivery    HISTORY OF PRESENT ILLNESS: 24 years old patient admitted with IUP at term for IOL who eventually had a normal, uneventful vaginal delivery.  She is actually stable, without complications.    OBJECTIVE:  VITALS:  Vitals:    08/15/24 2230   BP: 129/85   Pulse: 79   Resp: 18   Temp: 97.8 °F (36.6 °C)   SpO2: 100%      HEART: Regular rhythm, no murmur  LUNGS: Clear to auscultation at both fields  ABDOMEN: Soft and depressible, normal bowel sounds  PELVIC: Normal findings    Results for orders placed or performed during the hospital encounter of 08/13/24   Comprehensive metabolic panel   Result Value Ref Range    Sodium 137 136 - 145 mmol/L    Potassium 3.7 3.5 - 5.1 mmol/L    Chloride 108 97 - 108 mmol/L    CO2 22 21 - 32 mmol/L    Anion Gap 7 5 - 15 mmol/L    Glucose 66 65 - 100 mg/dL    BUN 6 6 - 20 mg/dL    Creatinine 0.63 0.55 - 1.02 mg/dL    BUN/Creatinine Ratio 10 (L) 12 - 20      Est, Glom Filt Rate >90 >60 ml/min/1.73m2    Calcium 9.1 8.5 - 10.1 mg/dL    Total Bilirubin 0.3 0.2 - 1.0 mg/dL    AST 20 15 - 37 U/L    ALT 15 12 - 78 U/L    Alk Phosphatase 153 (H) 45 - 117 U/L    Total Protein 6.9 6.4 - 8.2 g/dL    Albumin 2.7 (L) 3.5 - 5.0 g/dL    Globulin 4.2 (H) 2.0 - 4.0 g/dL    Albumin/Globulin Ratio 0.6 (L) 1.1 - 2.2     T. pallidum Ab   Result Value Ref Range    T Pallidun Ab Non Reactive Non Reactive   RPR   Result Value Ref Range    RPR NONREACTIVE NONREACTIVE   CBC   Result Value Ref Range    WBC 5.3 3.6 - 11.0 K/uL    RBC 3.96 3.80 - 5.20 M/uL    Hemoglobin 10.8 (L) 11.5 - 16.0 g/dL    Hematocrit 33.3 (L) 35.0 - 47.0 %    MCV 84.1 80.0 - 99.0 FL    MCH 27.3 26.0 - 34.0 PG    MCHC 32.4 30.0 - 36.5 g/dL    RDW 13.7 11.5 - 14.5 %    Platelets 274 150 - 400 K/uL    MPV 11.6 8.9 - 12.9 FL    Nucleated RBCs 0.0 0.0  WBC    nRBC 0.00

## 2024-08-16 NOTE — PLAN OF CARE
Problem: Postpartum  Goal: Experiences normal postpartum course  Description:  Postpartum OB-Pregnancy care plan goal which identifies if the mother is experiencing a normal postpartum course  8/15/2024 2300 by Margarita Sood RN  Outcome: Progressing  Flowsheets (Taken 8/15/2024 2230)  Experiences Normal Postpartum Course:   Monitor maternal vital signs   Assess uterine involution  8/15/2024 1221 by Rick Rivas RN  Outcome: HH/HSPC Progressing  8/15/2024 0916 by Radha Waddell RN  Outcome: Progressing  Flowsheets (Taken 8/15/2024 0916)  Experiences Normal Postpartum Course:   Monitor maternal vital signs   Assess uterine involution  Goal: Appropriate maternal -  bonding  Description:  Postpartum OB-Pregnancy care plan goal which identifies if the mother and  are bonding appropriately  8/15/2024 2300 by Margarita Sood RN  Outcome: Progressing  8/15/2024 1221 by Rick Rivas RN  Outcome: HH/HSPC Progressing  8/15/2024 0916 by Radha Waddell RN  Outcome: Progressing  Goal: Establishment of infant feeding pattern  Description:  Postpartum OB-Pregnancy care plan goal which identifies if the mother is establishing a feeding pattern with their   8/15/2024 2300 by Margarita Sood RN  Outcome: Progressing  Flowsheets (Taken 8/15/2024 2230)  Establishment of Infant Feeding Pattern:   Assess breast/bottle feeding   Refer to lactation as needed  8/15/2024 1221 by Rick Rivas RN  Outcome: HH/HSPC Progressing  8/15/2024 0916 by Radha Waddell RN  Outcome: Progressing  Flowsheets (Taken 8/15/2024 0916)  Establishment of Infant Feeding Pattern: Assess breast/bottle feeding  Goal: Incisions, wounds, or drain sites healing without S/S of infection  8/15/2024 2300 by Margarita Sood RN  Outcome: Progressing  Flowsheets (Taken 8/15/2024 2230)  Incisions, Wounds, or Drain Sites Healing Without Sign and Symptoms of Infection: TWICE DAILY: Assess and document skin integrity  8/15/2024 1221 by Rick Rivas  infection during hospitalization: Assess and monitor for signs and symptoms of infection  8/15/2024 1221 by Rick Rivas RN  Outcome: HH/HSPC Progressing  Goal: Absence of fever/infection during anticipated neutropenic period  8/15/2024 2300 by Margarita Sood RN  Outcome: Progressing  8/15/2024 1221 by Rick Rivas RN  Outcome: HH/HSPC Progressing     Problem: Safety - Adult  Goal: Free from fall injury  8/15/2024 2300 by Margarita Sood RN  Outcome: Progressing  Flowsheets (Taken 8/15/2024 2230)  Free From Fall Injury: Instruct family/caregiver on patient safety  8/15/2024 1221 by Rick Rivas RN  Outcome: HH/HSPC Progressing  8/15/2024 0916 by Radha Waddell RN  Outcome: Progressing  Flowsheets (Taken 8/15/2024 0715 by Leena Khan RN)  Free From Fall Injury: Instruct family/caregiver on patient safety     Problem: Discharge Planning  Goal: Discharge to home or other facility with appropriate resources  8/15/2024 2300 by Margarita Sood RN  Outcome: Progressing  Flowsheets (Taken 8/15/2024 2230)  Discharge to home or other facility with appropriate resources: Identify barriers to discharge with patient and caregiver  8/15/2024 1221 by Rick Rivas RN  Outcome: HH/HSPC Progressing  8/15/2024 0916 by Radha Waddell RN  Outcome: Progressing  Flowsheets (Taken 8/13/2024 1642)  Discharge to home or other facility with appropriate resources:   Identify barriers to discharge with patient and caregiver   Arrange for needed discharge resources and transportation as appropriate   Identify discharge learning needs (meds, wound care, etc)     Problem: Chronic Conditions and Co-morbidities  Goal: Patient's chronic conditions and co-morbidity symptoms are monitored and maintained or improved  8/15/2024 2300 by Maragrita Sood RN  Outcome: Progressing  Flowsheets (Taken 8/15/2024 2230)  Care Plan - Patient's Chronic Conditions and Co-Morbidity Symptoms are Monitored and Maintained or Improved: Monitor and assess

## 2024-08-17 VITALS
OXYGEN SATURATION: 97 % | WEIGHT: 235 LBS | HEIGHT: 66 IN | TEMPERATURE: 98.2 F | BODY MASS INDEX: 37.77 KG/M2 | HEART RATE: 77 BPM | DIASTOLIC BLOOD PRESSURE: 76 MMHG | SYSTOLIC BLOOD PRESSURE: 129 MMHG | RESPIRATION RATE: 18 BRPM

## 2024-08-17 PROCEDURE — 6370000000 HC RX 637 (ALT 250 FOR IP): Performed by: OBSTETRICS & GYNECOLOGY

## 2024-08-17 RX ADMIN — IBUPROFEN 800 MG: 800 TABLET, FILM COATED ORAL at 00:36

## 2024-08-17 RX ADMIN — IBUPROFEN 800 MG: 800 TABLET, FILM COATED ORAL at 09:33

## 2024-08-17 RX ADMIN — DOCUSATE SODIUM 100 MG: 100 CAPSULE, LIQUID FILLED ORAL at 09:31

## 2024-08-17 ASSESSMENT — PAIN SCALES - GENERAL
PAINLEVEL_OUTOF10: 0
PAINLEVEL_OUTOF10: 0

## 2024-08-17 NOTE — PLAN OF CARE
2310- Pt educated on milk production and milk engorgement and how to treat. Pt verbalizes understanding. PP supplies given.       Problem: Postpartum  Goal: Experiences normal postpartum course  Description:  Postpartum OB-Pregnancy care plan goal which identifies if the mother is experiencing a normal postpartum course  2024 by Margarita Sood RN  Outcome: Progressing  Flowsheets (Taken 2024)  Experiences Normal Postpartum Course:   Monitor maternal vital signs   Assess uterine involution  2024 1332 by Rick Rivas RN  Outcome: Progressing  Goal: Appropriate maternal -  bonding  Description:  Postpartum OB-Pregnancy care plan goal which identifies if the mother and  are bonding appropriately  2024 by Margarita Sood RN  Outcome: Progressing  20242 by Rick Rivas RN  Outcome: Progressing  Goal: Establishment of infant feeding pattern  Description:  Postpartum OB-Pregnancy care plan goal which identifies if the mother is establishing a feeding pattern with their   2024 by Margarita Sood RN  Outcome: Progressing  Flowsheets (Taken 2024)  Establishment of Infant Feeding Pattern: Assess breast/bottle feeding  20242 by Rick Rivas RN  Outcome: Progressing  Goal: Incisions, wounds, or drain sites healing without S/S of infection  2024 by Margarita Sood RN  Outcome: Progressing  Flowsheets (Taken 2024)  Incisions, Wounds, or Drain Sites Healing Without Sign and Symptoms of Infection: TWICE DAILY: Assess and document skin integrity  20242 by Rick Rivas RN  Outcome: Progressing     Problem: Pain  Goal: Verbalizes/displays adequate comfort level or baseline comfort level  2024 by Margarita Sood RN  Outcome: Progressing  Flowsheets (Taken 2024)  Verbalizes/displays adequate comfort level or baseline comfort level:   Encourage patient to monitor pain and request  assistance   Assess pain using appropriate pain scale   Administer analgesics based on type and severity of pain and evaluate response  8/16/2024 1332 by Rick Rivas RN  Outcome: Progressing  Flowsheets (Taken 8/16/2024 0205 by Margarita Sood RN)  Verbalizes/displays adequate comfort level or baseline comfort level:   Encourage patient to monitor pain and request assistance   Assess pain using appropriate pain scale   Administer analgesics based on type and severity of pain and evaluate response     Problem: Infection - Adult  Goal: Absence of infection at discharge  8/16/2024 2204 by Margarita Sood RN  Outcome: Progressing  Flowsheets (Taken 8/16/2024 2030)  Absence of infection at discharge: Assess and monitor for signs and symptoms of infection  8/16/2024 1332 by Rick Rivas RN  Outcome: Progressing  Goal: Absence of infection during hospitalization  8/16/2024 2204 by Margarita Sood RN  Outcome: Progressing  Flowsheets (Taken 8/16/2024 2030)  Absence of infection during hospitalization: Assess and monitor for signs and symptoms of infection  8/16/2024 1332 by Rick Rivas RN  Outcome: Progressing  Goal: Absence of fever/infection during anticipated neutropenic period  8/16/2024 2204 by Margarita Sood RN  Outcome: Progressing  8/16/2024 1332 by Rick Rivas RN  Outcome: Progressing     Problem: Safety - Adult  Goal: Free from fall injury  8/16/2024 2204 by Margarita Sood RN  Outcome: Progressing  Flowsheets (Taken 8/16/2024 2030)  Free From Fall Injury: Instruct family/caregiver on patient safety  8/16/2024 1332 by Rick Rivas RN  Outcome: Progressing     Problem: Discharge Planning  Goal: Discharge to home or other facility with appropriate resources  8/16/2024 2204 by Margarita Sood RN  Outcome: Progressing  Flowsheets (Taken 8/16/2024 2030)  Discharge to home or other facility with appropriate resources: Identify barriers to discharge with patient and caregiver  8/16/2024 1332 by Evelyn

## 2024-08-17 NOTE — DISCHARGE SUMMARY
OBG GYN Discharge Summary     Patient ID:  Jaime Cisse  [id number]  24 y.o.  2000    Admit date: 2024    Discharge date and time: 24    Admitting Physician: Dory Spicer MD     Discharge Physician: Judd Boggs MD    Admission Diagnoses: 39 weeks gestation of pregnancy [Z3A.39]    Hospital Course: Jaime Cisse had unremarkeable progressive recovery.  and Eating, ambulating, and voiding in a routine manner.    Significant Diagnostic Studies: No results found for this or any previous visit (from the past 24 hour(s)).    Procedures:      Discharge Exam:  BP (!) 142/95   Pulse 68   Temp 98 °F (36.7 °C) (Oral)   Resp 18   Ht 1.676 m (5' 6\")   Wt 106.6 kg (235 lb)   LMP 2023   SpO2 99%   Breastfeeding Unknown   BMI 37.93 kg/m²        Patient Instructions:   Current Discharge Medication List        START taking these medications    Details   oxyCODONE (ROXICODONE) 5 MG immediate release tablet Take 1 tablet by mouth every 6 hours as needed for Pain for up to 2 days. Max Daily Amount: 20 mg  Qty: 8 tablet, Refills: 0    Comments: Reduce doses taken as pain becomes manageable  Associated Diagnoses:  (normal spontaneous vaginal delivery)      ibuprofen (ADVIL;MOTRIN) 800 MG tablet Take 1 tablet by mouth every 8 hours as needed for Pain  Qty: 30 tablet, Refills: 0           STOP taking these medications       clotrimazole (LOTRIMIN) 2 % CREA vaginal cream Comments:   Reason for Stopping:         butalbital-acetaminophen-caffeine (FIORICET, ESGIC) -40 MG per tablet Comments:   Reason for Stopping:         sertraline (ZOLOFT) 50 MG tablet Comments:   Reason for Stopping:         SUMAtriptan (IMITREX) 50 MG tablet Comments:   Reason for Stopping:              Activity: physical activity is restricted per discharge instructions  Diet: resume normal diet  Wound Care: none needed  Disposition: Home  Follow-up in 6 weeks.    Signed:  Judd Boggs MD  2024  8:37 AM

## 2024-08-17 NOTE — PLAN OF CARE
Problem: Postpartum  Goal: Experiences normal postpartum course  Description:  Postpartum OB-Pregnancy care plan goal which identifies if the mother is experiencing a normal postpartum course  Outcome: HH/HSPC Resolved Met  Goal: Appropriate maternal -  bonding  Description:  Postpartum OB-Pregnancy care plan goal which identifies if the mother and  are bonding appropriately  Outcome: HH/HSPC Resolved Met  Goal: Establishment of infant feeding pattern  Description:  Postpartum OB-Pregnancy care plan goal which identifies if the mother is establishing a feeding pattern with their   Outcome: HH/HSPC Resolved Met  Goal: Incisions, wounds, or drain sites healing without S/S of infection  Outcome: HH/HSPC Resolved Met     Problem: Pain  Goal: Verbalizes/displays adequate comfort level or baseline comfort level  Outcome: HH/HSPC Resolved Met  Flowsheets  Taken 2024 0815 by Isa Armstrong RN  Verbalizes/displays adequate comfort level or baseline comfort level:   Encourage patient to monitor pain and request assistance   Assess pain using appropriate pain scale   Administer analgesics based on type and severity of pain and evaluate response   Implement non-pharmacological measures as appropriate and evaluate response   Consider cultural and social influences on pain and pain management   Notify Licensed Independent Practitioner if interventions unsuccessful or patient reports new pain  Taken 2024 2309 by Margarita Sood, LICHA  Verbalizes/displays adequate comfort level or baseline comfort level:   Encourage patient to monitor pain and request assistance   Assess pain using appropriate pain scale   Administer analgesics based on type and severity of pain and evaluate response     Problem: Infection - Adult  Goal: Absence of infection at discharge  Outcome: HH/HSPC Resolved Met  Flowsheets (Taken 2024 0815)  Absence of infection at discharge: Assess and monitor for signs and symptoms

## 2024-09-23 ENCOUNTER — TELEPHONE (OUTPATIENT)
Facility: CLINIC | Age: 24
End: 2024-09-23

## 2024-09-23 NOTE — TELEPHONE ENCOUNTER
Patient came in today for her appointment and signed the clipboard but unfortunately was missed. I did apologize that we overlooked her but unfortunately the provider could not stay and see her. Again I apologize over and over again. We tried to accommodate her due to the fact we overlooked her. I told her she could be seen tomorrow. She begin to cuss at me and got on her phone and said these \"motherfuckers forgot about me. I should knock those fucking papers off your desk.\" Again I apologize and told her I understood why she was upset but she did not have to cuss at me. Then she continued to cuss at my , Yolanda apologized as well and told her that she could get her scheduled. Again she apologized over and over again. She ask her to stop cussing and the patient threatened her, me and the nurse. She stated that she would we might have to get the [place] police up in here.Then Yolanda  asked her to leave since she she was threatiening us and patient left.

## 2024-09-24 ENCOUNTER — POSTPARTUM VISIT (OUTPATIENT)
Age: 24
End: 2024-09-24

## 2024-09-24 VITALS
SYSTOLIC BLOOD PRESSURE: 127 MMHG | HEIGHT: 66 IN | BODY MASS INDEX: 36.52 KG/M2 | WEIGHT: 227.25 LBS | DIASTOLIC BLOOD PRESSURE: 76 MMHG

## 2024-09-24 DIAGNOSIS — N92.6 IRREGULAR MENSES: ICD-10-CM

## 2024-09-24 PROCEDURE — 0503F POSTPARTUM CARE VISIT: CPT | Performed by: OBSTETRICS & GYNECOLOGY

## 2024-09-24 RX ORDER — NORGESTIMATE AND ETHINYL ESTRADIOL 0.25-0.035
1 KIT ORAL DAILY
Qty: 1 PACKET | Refills: 5 | Status: SHIPPED | OUTPATIENT
Start: 2024-09-24

## 2024-12-31 DIAGNOSIS — N92.6 IRREGULAR MENSES: ICD-10-CM

## 2024-12-31 RX ORDER — NORGESTIMATE AND ETHINYL ESTRADIOL 0.25-0.035
1 KIT ORAL DAILY
Qty: 28 TABLET | Refills: 0 | Status: SHIPPED | OUTPATIENT
Start: 2024-12-31

## 2024-12-31 NOTE — TELEPHONE ENCOUNTER
Per Dr. Kaylah branch to refill one pack of Estarylla 0.25-0.35 mg-mcg per tablet take one tablet by mouth daily. 28 ct no refills per verbal order with read back.

## 2025-01-21 ENCOUNTER — OFFICE VISIT (OUTPATIENT)
Facility: CLINIC | Age: 25
End: 2025-01-21

## 2025-01-21 VITALS
OXYGEN SATURATION: 98 % | DIASTOLIC BLOOD PRESSURE: 60 MMHG | WEIGHT: 234 LBS | TEMPERATURE: 97.5 F | RESPIRATION RATE: 20 BRPM | HEART RATE: 86 BPM | BODY MASS INDEX: 36.73 KG/M2 | SYSTOLIC BLOOD PRESSURE: 126 MMHG | HEIGHT: 67 IN

## 2025-01-21 DIAGNOSIS — R74.8 ELEVATED ALKALINE PHOSPHATASE LEVEL: ICD-10-CM

## 2025-01-21 DIAGNOSIS — E66.09 CLASS 2 OBESITY DUE TO EXCESS CALORIES WITHOUT SERIOUS COMORBIDITY WITH BODY MASS INDEX (BMI) OF 36.0 TO 36.9 IN ADULT: Primary | ICD-10-CM

## 2025-01-21 DIAGNOSIS — E66.812 CLASS 2 OBESITY DUE TO EXCESS CALORIES WITHOUT SERIOUS COMORBIDITY WITH BODY MASS INDEX (BMI) OF 36.0 TO 36.9 IN ADULT: Primary | ICD-10-CM

## 2025-01-21 DIAGNOSIS — D64.9 ANEMIA, UNSPECIFIED TYPE: ICD-10-CM

## 2025-01-21 DIAGNOSIS — L83 ACANTHOSIS NIGRICANS: ICD-10-CM

## 2025-01-21 DIAGNOSIS — R73.9 HYPERGLYCEMIA: ICD-10-CM

## 2025-01-21 LAB — HBA1C MFR BLD: 5.3 %

## 2025-01-21 RX ORDER — PHENTERMINE HYDROCHLORIDE 37.5 MG/1
37.5 TABLET ORAL
Qty: 30 TABLET | Refills: 2 | Status: SHIPPED | OUTPATIENT
Start: 2025-01-21 | End: 2025-04-21

## 2025-01-21 ASSESSMENT — PATIENT HEALTH QUESTIONNAIRE - PHQ9
4. FEELING TIRED OR HAVING LITTLE ENERGY: MORE THAN HALF THE DAYS
SUM OF ALL RESPONSES TO PHQ QUESTIONS 1-9: 8
SUM OF ALL RESPONSES TO PHQ9 QUESTIONS 1 & 2: 2
6. FEELING BAD ABOUT YOURSELF - OR THAT YOU ARE A FAILURE OR HAVE LET YOURSELF OR YOUR FAMILY DOWN: NOT AT ALL
SUM OF ALL RESPONSES TO PHQ QUESTIONS 1-9: 8
8. MOVING OR SPEAKING SO SLOWLY THAT OTHER PEOPLE COULD HAVE NOTICED. OR THE OPPOSITE, BEING SO FIGETY OR RESTLESS THAT YOU HAVE BEEN MOVING AROUND A LOT MORE THAN USUAL: NOT AT ALL
9. THOUGHTS THAT YOU WOULD BE BETTER OFF DEAD, OR OF HURTING YOURSELF: NOT AT ALL
3. TROUBLE FALLING OR STAYING ASLEEP: NOT AT ALL
1. LITTLE INTEREST OR PLEASURE IN DOING THINGS: NOT AT ALL
SUM OF ALL RESPONSES TO PHQ QUESTIONS 1-9: 8
5. POOR APPETITE OR OVEREATING: NEARLY EVERY DAY
7. TROUBLE CONCENTRATING ON THINGS, SUCH AS READING THE NEWSPAPER OR WATCHING TELEVISION: SEVERAL DAYS
2. FEELING DOWN, DEPRESSED OR HOPELESS: MORE THAN HALF THE DAYS
10. IF YOU CHECKED OFF ANY PROBLEMS, HOW DIFFICULT HAVE THESE PROBLEMS MADE IT FOR YOU TO DO YOUR WORK, TAKE CARE OF THINGS AT HOME, OR GET ALONG WITH OTHER PEOPLE: VERY DIFFICULT
SUM OF ALL RESPONSES TO PHQ QUESTIONS 1-9: 8

## 2025-01-21 ASSESSMENT — ENCOUNTER SYMPTOMS
ABDOMINAL PAIN: 0
SHORTNESS OF BREATH: 0
COUGH: 0

## 2025-01-21 NOTE — PROGRESS NOTES
\"Have you been to the ER, urgent care clinic since your last visit?  Hospitalized since your last visit?\"    YES - When: approximately 08/13/2024 ago.  Where and Why: [facility] for delivery of baby.    “Have you seen or consulted any other health care providers outside of [facility] since your last visit?”    NO    Chief Complaint   Patient presents with    Follow-up Chronic Condition    Obesity     /60 (Site: Right Upper Arm, Position: Sitting, Cuff Size: Medium Adult)   Pulse 86   Temp 97.5 °F (36.4 °C) (Temporal)   Resp 20   Ht 1.702 m (5' 7\")   Wt 106.1 kg (234 lb)   LMP 01/19/2025   SpO2 98%   BMI 36.65 kg/m²               Click Here for Release of Records Request   
visit on 01/21/25   AMB POC HEMOGLOBIN A1C   Result Value Ref Range    Hemoglobin A1C, POC 5.3 %     Anxiety/depression, has episodes, but not interested to start medications.  Return in about 3 months (around 4/21/2025) for follow up.         SUBJECTIVE/OBJECTIVE:  BRAYDON Cisse is a 24 yr old who presents for a follow and discuss weight loss management. She was previously a patient of Dr. Smith.   She had a normal vaginal delivery about 5 months ago, currently not breast-feeding.  She says she is off medications for anxiety and depression.  No Known Allergies  Current Outpatient Medications   Medication Sig Dispense Refill    liraglutide-weight management 18 MG/3ML SOPN Inject 0.6 mg into the skin daily for 7 days, THEN 1.2 mg daily for 7 days, THEN 1.8 mg daily for 7 days, THEN 2.4 mg daily for 7 days, THEN 3 mg daily. 4 Adjustable Dose Pre-filled Pen Syringe 2    phentermine (ADIPEX-P) 37.5 MG tablet Take 1 tablet by mouth every morning (before breakfast) for 90 days. Max Daily Amount: 37.5 mg 30 tablet 2    norgestimate-ethinyl estradiol (ESTARYLLA) 0.25-35 MG-MCG per tablet TAKE 1 TABLET BY MOUTH DAILY 28 tablet 0    ibuprofen (ADVIL;MOTRIN) 800 MG tablet Take 1 tablet by mouth every 8 hours as needed for Pain 30 tablet 0     No current facility-administered medications for this visit.      Past Medical History:   Diagnosis Date    Carrier of spinal muscular atrophy     Chlamydial infection 07/15/2020    Depression     Genital herpes simplex 07/15/2020    Herpes     LGSIL on Pap smear of cervix 08/22/2021    Morbid obesity 07/15/2020    Trauma     Vaginal irritation 05/02/2022    Vaginal irritation 2022    Vaginal pain 07/15/2020    *Chronic     History reviewed. No pertinent surgical history.  Family History   Family history unknown: Yes     Social History     Tobacco Use   Smoking Status Every Day    Types: Cigars   Smokeless Tobacco Never         Review of Systems   Constitutional:  Negative for

## 2025-01-22 LAB
ALBUMIN SERPL-MCNC: 4.2 G/DL (ref 4–5)
ALP SERPL-CCNC: 60 IU/L (ref 44–121)
ALT SERPL-CCNC: 14 IU/L (ref 0–32)
AST SERPL-CCNC: 17 IU/L (ref 0–40)
BILIRUB SERPL-MCNC: <0.2 MG/DL (ref 0–1.2)
BUN SERPL-MCNC: 8 MG/DL (ref 6–20)
BUN/CREAT SERPL: 15 (ref 9–23)
CALCIUM SERPL-MCNC: 9.4 MG/DL (ref 8.7–10.2)
CHLORIDE SERPL-SCNC: 104 MMOL/L (ref 96–106)
CO2 SERPL-SCNC: 24 MMOL/L (ref 20–29)
CREAT SERPL-MCNC: 0.53 MG/DL (ref 0.57–1)
EGFRCR SERPLBLD CKD-EPI 2021: 132 ML/MIN/1.73
ERYTHROCYTE [DISTWIDTH] IN BLOOD BY AUTOMATED COUNT: 12.6 % (ref 11.7–15.4)
GLOBULIN SER CALC-MCNC: 2.8 G/DL (ref 1.5–4.5)
GLUCOSE SERPL-MCNC: 72 MG/DL (ref 70–99)
HCT VFR BLD AUTO: 40.1 % (ref 34–46.6)
HGB BLD-MCNC: 12.8 G/DL (ref 11.1–15.9)
MCH RBC QN AUTO: 28 PG (ref 26.6–33)
MCHC RBC AUTO-ENTMCNC: 31.9 G/DL (ref 31.5–35.7)
MCV RBC AUTO: 88 FL (ref 79–97)
PLATELET # BLD AUTO: 315 X10E3/UL (ref 150–450)
POTASSIUM SERPL-SCNC: 4.5 MMOL/L (ref 3.5–5.2)
PROT SERPL-MCNC: 7 G/DL (ref 6–8.5)
RBC # BLD AUTO: 4.57 X10E6/UL (ref 3.77–5.28)
SODIUM SERPL-SCNC: 141 MMOL/L (ref 134–144)
WBC # BLD AUTO: 3.7 X10E3/UL (ref 3.4–10.8)

## 2025-02-12 ENCOUNTER — OFFICE VISIT (OUTPATIENT)
Age: 25
End: 2025-02-12
Payer: MEDICAID

## 2025-02-12 VITALS
BODY MASS INDEX: 37.11 KG/M2 | WEIGHT: 236.44 LBS | HEIGHT: 67 IN | SYSTOLIC BLOOD PRESSURE: 137 MMHG | DIASTOLIC BLOOD PRESSURE: 90 MMHG

## 2025-02-12 DIAGNOSIS — Z11.3 SCREENING FOR VENEREAL DISEASE: ICD-10-CM

## 2025-02-12 DIAGNOSIS — Z01.419 PAP SMEAR, AS PART OF ROUTINE GYNECOLOGICAL EXAMINATION: ICD-10-CM

## 2025-02-12 DIAGNOSIS — Z00.00 ANNUAL VISIT FOR GENERAL ADULT MEDICAL EXAMINATION WITHOUT ABNORMAL FINDINGS: ICD-10-CM

## 2025-02-12 DIAGNOSIS — Z11.51 SCREENING FOR HUMAN PAPILLOMAVIRUS: Primary | ICD-10-CM

## 2025-02-12 DIAGNOSIS — N92.6 IRREGULAR MENSES: ICD-10-CM

## 2025-02-12 PROCEDURE — 99395 PREV VISIT EST AGE 18-39: CPT | Performed by: OBSTETRICS & GYNECOLOGY

## 2025-02-12 RX ORDER — NORGESTIMATE AND ETHINYL ESTRADIOL 0.25-0.035
1 KIT ORAL DAILY
Qty: 1 PACKET | Refills: 11 | OUTPATIENT
Start: 2025-02-12

## 2025-02-12 NOTE — PROGRESS NOTES
rfx Aptima HPV ASCUS (199325)     [facility]   ACCOUNT # [id number]     Order Specific Question:   Pap Source?          (Required)     Answer:   CERVIX     Order Specific Question:   Pap collection method?          (Required)     Answer:   BROOM-ALONE     Order Specific Question:   Menstrual Status ?     Answer:   Having periods [5]     Order Specific Question:   Date of LMP?          (Required)     Answer:   1/19/2025     23 YO ANNUAL    1. Screening for human papillomavirus    - PAP IG, CT-NG-TV, rfx Aptima HPV ASCUS (199325)    2. Screening for venereal disease    - PAP IG, CT-NG-TV, rfx Aptima HPV ASCUS (199325)    3. Pap smear, as part of routine gynecological examination    - PAP IG, CT-NG-TV, rfx Aptima HPV ASCUS (199325)    4. Annual visit for general adult medical examination without abnormal findings      5. Irregular menses    - norgestimate-ethinyl estradiol (SPRINTEC 28) 0.25-35 MG-MCG per tablet; Take 1 tablet by mouth daily DISPENSE AS PRESCRIBED, PT HAD SIGNIFICANT WEIGHT GAIN WITH OFF   BRAND  Dispense: 1 packet; Refill: 11       No follow-ups on file.

## 2025-02-18 DIAGNOSIS — N92.6 IRREGULAR MENSES: ICD-10-CM

## 2025-02-18 RX ORDER — NORGESTIMATE AND ETHINYL ESTRADIOL 0.25-0.035
1 KIT ORAL DAILY
Qty: 1 PACKET | Refills: 11 | Status: SHIPPED | OUTPATIENT
Start: 2025-02-18

## 2025-04-05 NOTE — PROGRESS NOTES
Cameron Goodwin is a 24 y.o. female who presents today for the following:  Chief Complaint   Patient presents with    Annual Exam     Pt presents for annual exam and to discuss cycle control //MKeeley         No Known Allergies    No current outpatient medications on file. No current facility-administered medications for this visit. Past Medical History:   Diagnosis Date    Chlamydial infection 7/15/2020    Genital herpes simplex 7/15/2020    Herpes     LGSIL on Pap smear of cervix 08-22-21    Morbid obesity ([address] 7/15/2020    Vaginal pain 7/15/2020    *Chronic       No past surgical history on file. No family history on file. Social History     Socioeconomic History    Marital status: SINGLE     Spouse name: Not on file    Number of children: Not on file    Years of education: Not on file    Highest education level: Not on file   Occupational History    Not on file   Tobacco Use    Smoking status: Current Every Day Smoker    Smokeless tobacco: Never Used    Tobacco comment: pt smokes black and milds   Substance and Sexual Activity    Alcohol use: Not Currently    Drug use: Not on file    Sexual activity: Yes     Partners: Male     Birth control/protection: Pill   Other Topics Concern    Not on file   Social History Narrative    Not on file     Social Determinants of Health     Financial Resource Strain:     Difficulty of Paying Living Expenses:    Food Insecurity:     Worried About Running Out of Food in the Last Year:     [address] in the Last Year:    Transportation Needs:     Lack of Transportation (Medical):      Lack of Transportation (Non-Medical):    Physical Activity:     Days of Exercise per Week:     Minutes of Exercise per Session:    Stress:     Feeling of Stress :    Social Connections:     Frequency of Communication with Friends and Family:     Frequency of Social Gatherings with Friends and Family:     Attends Mandaeism Services:     Active Member of Clubs or Organizations:     Attends Club or Organization Meetings:     Marital Status:    Intimate Partner Violence:     Fear of Current or Ex-Partner:     Emotionally Abused:     Physically Abused:     Sexually Abused:          ROS   Review of Systems   Constitutional: Negative. HENT: Negative. Eyes: Negative. Respiratory: Negative. Cardiovascular: Negative. Gastrointestinal: Negative. Genitourinary: Negative. Musculoskeletal: Negative. Skin: Negative. Neurological: Negative. Endo/Heme/Allergies: Negative. Psychiatric/Behavioral: Negative. BP (!) 141/79   Ht 5' 7\" (1.702 m)   Wt 247 lb (112 kg)   BMI 38.69 kg/m²    OBGyn Exam   Constitutional  · Appearance: well-nourished, well developed, alert, in no acute distress    HENT  · Head and Face: appears normal    Neck  · Inspection/Palpation: normal appearance, no masses or tenderness  · Lymph Nodes: no lymphadenopathy present  · Thyroid: gland size normal, nontender, no nodules or masses present on palpation    Breasts   Symmetric, no palpable masses, no tenderness, no skin changes, no nipple abnormality, no nipple discharge, no lymphadenopathy.     Chest  · Respiratory Effort: breathing labored  · Auscultation: normal breath sounds    Cardiovascular  · Heart:  · Auscultation: regular rate and rhythm without murmur    Gastrointestinal  · Abdominal Examination: abdomen non-tender to palpation, normal bowel sounds, no masses present  · Liver and spleen: no hepatomegaly present, spleen not palpable  · Hernias: no hernias identified    Genitourinary  · External Genitalia: normal appearance for age, no discharge present, no tenderness present, no inflammatory lesions present, no masses present, no atrophy present  · Vagina: normal vaginal vault without central or paravaginal defects, no discharge present, no inflammatory lesions present, no masses present  · Bladder: non-tender to palpation  · Urethra: appears normal  · Cervix: normal   · Uterus: normal size, shape and consistency  · Adnexa: no adnexal tenderness present, no adnexal masses present  · Perineum: perineum within normal limits, no evidence of trauma, no rashes or skin lesions present  · Anus: anus within normal limits, no hemorrhoids present  · Inguinal Lymph Nodes: no lymphadenopathy present    Skin  · General Inspection: no rash, no lesions identified    Neurologic/Psychiatric  · Mental Status:  · Orientation: grossly oriented to person, place and time  · Mood and Affect: mood normal, affect appropriate    No results found for this visit on 09/01/21. Orders Placed This Encounter    PAP IG, CT-NG-TV, RFX APTIMA HPV ASCUS ([id number])     Order Specific Question:   Pap Source? Answer:   Cervical     Order Specific Question:   Total Hysterectomy? Answer:   No     Order Specific Question:   Supracervical Hysterectomy? Answer:   No     Order Specific Question:   Post Menopausal?     Answer:   No     Order Specific Question:   Hormone Therapy? Answer:   No     Order Specific Question:   IUD? Answer:   No     Order Specific Question:   Abnormal Bleeding? Answer:   No     Order Specific Question:   Pregnant     Answer:   No     Order Specific Question:   Post Partum? Answer:   No     25 yo ANNUAL    1. Pap smear, as part of routine gynecological examination    - PAP IG, CT-NG-TV, RFX APTIMA HPV ASCUS ([id number])    2. Screening for human papillomavirus    - PAP IG, CT-NG-TV, RFX APTIMA HPV ASCUS ([id number])    3.  Screening for venereal disease    - PAP IG, CT-NG-TV, RFX APTIMA HPV ASCUS ([id number])    RTC FOR Justyna Phelps show

## 2025-07-30 ENCOUNTER — TELEPHONE (OUTPATIENT)
Facility: CLINIC | Age: 25
End: 2025-07-30

## 2025-07-30 ENCOUNTER — TELEMEDICINE (OUTPATIENT)
Facility: CLINIC | Age: 25
End: 2025-07-30
Payer: MEDICAID

## 2025-07-30 ENCOUNTER — TELEPHONE (OUTPATIENT)
Age: 25
End: 2025-07-30

## 2025-07-30 DIAGNOSIS — E66.9 OBESITY, UNSPECIFIED CLASS, UNSPECIFIED OBESITY TYPE, UNSPECIFIED WHETHER SERIOUS COMORBIDITY PRESENT: Primary | ICD-10-CM

## 2025-07-30 DIAGNOSIS — Z13.220 SCREENING FOR HYPERLIPIDEMIA: ICD-10-CM

## 2025-07-30 DIAGNOSIS — L83 ACANTHOSIS NIGRICANS: ICD-10-CM

## 2025-07-30 DIAGNOSIS — E66.9 OBESITY, UNSPECIFIED CLASS, UNSPECIFIED OBESITY TYPE, UNSPECIFIED WHETHER SERIOUS COMORBIDITY PRESENT: ICD-10-CM

## 2025-07-30 PROCEDURE — 99214 OFFICE O/P EST MOD 30 MIN: CPT | Performed by: STUDENT IN AN ORGANIZED HEALTH CARE EDUCATION/TRAINING PROGRAM

## 2025-07-30 RX ORDER — HYDROCODONE BITARTRATE AND ACETAMINOPHEN 5; 325 MG/1; MG/1
TABLET ORAL
COMMUNITY
Start: 2025-07-28

## 2025-07-30 NOTE — PROGRESS NOTES
\"Have you been to the ER, urgent care clinic since your last visit?  Hospitalized since your last visit?\"    NO    “Have you seen or consulted any other health care providers outside of [facility] since your last visit?”    NO    Chief Complaint   Patient presents with    Follow-up Chronic Condition                   Click Here for Release of Records Request

## 2025-07-30 NOTE — TELEPHONE ENCOUNTER
Patient has been sent the link for our pre-recorded [facility] Medical Weight Loss Orientation. Patient is required to register, view the recording, call insurance to verify benefits, then send an email to the  to schedule a consultation.

## 2025-07-30 NOTE — PROGRESS NOTES
Jaime Cisse, was evaluated through a synchronous (real-time) audio-video encounter. The patient (or guardian if applicable) is aware that this is a billable service, which includes applicable co-pays. This Virtual Visit was conducted with patient's (and/or legal guardian's) consent. Patient identification was verified, and a caregiver was present when appropriate.   The patient was located at Home: [address] [place] [address]  Provider was located at [facility]  Confirm you are appropriately licensed, registered, or certified to deliver care in the state where the patient is located as indicated above. If you are not or unsure, please re-schedule the visit: Yes, I confirm.     Jaime Cisse (:  2000) is a Established patient, presenting virtually for evaluation of the following:      Below is the assessment and plan developed based on review of pertinent history, physical exam, labs, studies, and medications.     Assessment & Plan  Obesity, unspecified class, unspecified obesity type, unspecified whether serious comorbidity present   Cannot do phentermine long-term and she states that it did not help much, GLP-1 agonists not covered.  Discussed about Contrave, but she says she took bupropion in the past and does not want to take it.  I have given her referral to the weight loss clinic.  Recommend to continue exercise ,eating healthy.    Orders:    Hemoglobin A1C; Future    [facility] - Dory Collazo MD, Bariatrics (non Surgical), [facility], [place] [address]    Acanthosis nigricans   Continue lifestyle changes for weight loss, previous A1c in  was normal, recheck    Orders:    Hemoglobin A1C; Future    Screening for hyperlipidemia   Blood work ordered    Orders:    Lipid Panel; Future    Recommend to continue follow-up with gynecologist     No follow-ups on file.       Subjective   HPI  Presents to discuss weight loss management, was seen in January, at that time